# Patient Record
Sex: FEMALE | Race: WHITE | NOT HISPANIC OR LATINO | Employment: FULL TIME | URBAN - METROPOLITAN AREA
[De-identification: names, ages, dates, MRNs, and addresses within clinical notes are randomized per-mention and may not be internally consistent; named-entity substitution may affect disease eponyms.]

---

## 2017-01-17 ENCOUNTER — ALLSCRIPTS OFFICE VISIT (OUTPATIENT)
Dept: OTHER | Facility: OTHER | Age: 42
End: 2017-01-17

## 2017-01-17 DIAGNOSIS — E78.5 HYPERLIPIDEMIA: ICD-10-CM

## 2017-01-17 DIAGNOSIS — E55.9 VITAMIN D DEFICIENCY: ICD-10-CM

## 2017-01-17 DIAGNOSIS — D51.9 VITAMIN B12 DEFICIENCY ANEMIA: ICD-10-CM

## 2017-03-08 ENCOUNTER — TRANSCRIBE ORDERS (OUTPATIENT)
Dept: ADMINISTRATIVE | Facility: HOSPITAL | Age: 42
End: 2017-03-08

## 2017-03-08 ENCOUNTER — ALLSCRIPTS OFFICE VISIT (OUTPATIENT)
Dept: OTHER | Facility: OTHER | Age: 42
End: 2017-03-08

## 2017-03-08 DIAGNOSIS — R10.9 ABDOMINAL PAIN, UNSPECIFIED SITE: Primary | ICD-10-CM

## 2017-03-16 ENCOUNTER — HOSPITAL ENCOUNTER (OUTPATIENT)
Dept: RADIOLOGY | Age: 42
Discharge: HOME | End: 2017-03-16
Payer: COMMERCIAL

## 2017-03-16 DIAGNOSIS — R10.9 ABDOMINAL PAIN, UNSPECIFIED SITE: ICD-10-CM

## 2017-03-16 PROCEDURE — 74177 CT ABD & PELVIS W/CONTRAST: CPT

## 2017-03-16 RX ADMIN — IOHEXOL 100 ML: 350 INJECTION, SOLUTION INTRAVENOUS at 15:17

## 2017-04-11 ENCOUNTER — ALLSCRIPTS OFFICE VISIT (OUTPATIENT)
Dept: OTHER | Facility: OTHER | Age: 42
End: 2017-04-11

## 2017-04-11 DIAGNOSIS — E55.9 VITAMIN D DEFICIENCY: ICD-10-CM

## 2017-05-31 DIAGNOSIS — Z72.51 HIGH RISK HETEROSEXUAL BEHAVIOR: ICD-10-CM

## 2017-06-06 ENCOUNTER — ALLSCRIPTS OFFICE VISIT (OUTPATIENT)
Dept: OTHER | Facility: OTHER | Age: 42
End: 2017-06-06

## 2017-10-10 ENCOUNTER — GENERIC CONVERSION - ENCOUNTER (OUTPATIENT)
Dept: OTHER | Facility: OTHER | Age: 42
End: 2017-10-10

## 2017-10-10 DIAGNOSIS — E55.9 VITAMIN D DEFICIENCY: ICD-10-CM

## 2018-01-14 VITALS
WEIGHT: 158.5 LBS | OXYGEN SATURATION: 97 % | SYSTOLIC BLOOD PRESSURE: 108 MMHG | BODY MASS INDEX: 28.08 KG/M2 | HEIGHT: 63 IN | DIASTOLIC BLOOD PRESSURE: 78 MMHG | TEMPERATURE: 98.6 F | HEART RATE: 89 BPM

## 2018-01-14 VITALS
WEIGHT: 154.4 LBS | SYSTOLIC BLOOD PRESSURE: 110 MMHG | BODY MASS INDEX: 27.36 KG/M2 | OXYGEN SATURATION: 98 % | DIASTOLIC BLOOD PRESSURE: 66 MMHG | HEIGHT: 63 IN | TEMPERATURE: 97.3 F | HEART RATE: 89 BPM

## 2018-01-14 VITALS
HEIGHT: 63 IN | OXYGEN SATURATION: 98 % | HEART RATE: 78 BPM | WEIGHT: 161 LBS | DIASTOLIC BLOOD PRESSURE: 74 MMHG | TEMPERATURE: 97.8 F | BODY MASS INDEX: 28.53 KG/M2 | SYSTOLIC BLOOD PRESSURE: 106 MMHG

## 2018-01-15 VITALS
SYSTOLIC BLOOD PRESSURE: 114 MMHG | DIASTOLIC BLOOD PRESSURE: 78 MMHG | BODY MASS INDEX: 28.39 KG/M2 | TEMPERATURE: 98.6 F | OXYGEN SATURATION: 98 % | HEART RATE: 96 BPM | HEIGHT: 63 IN | WEIGHT: 160.25 LBS

## 2018-01-22 VITALS
HEART RATE: 86 BPM | TEMPERATURE: 99.1 F | SYSTOLIC BLOOD PRESSURE: 118 MMHG | WEIGHT: 155.5 LBS | OXYGEN SATURATION: 98 % | BODY MASS INDEX: 27.55 KG/M2 | HEIGHT: 63 IN | DIASTOLIC BLOOD PRESSURE: 74 MMHG

## 2018-03-14 ENCOUNTER — TELEPHONE (OUTPATIENT)
Dept: INTERNAL MEDICINE CLINIC | Facility: CLINIC | Age: 43
End: 2018-03-14

## 2018-03-14 DIAGNOSIS — D50.9 IRON DEFICIENCY ANEMIA, UNSPECIFIED IRON DEFICIENCY ANEMIA TYPE: Primary | ICD-10-CM

## 2018-03-14 NOTE — TELEPHONE ENCOUNTER
This patient called the office today, she has labs to get done but she wanted to see if Levie Ashley could be added to her labs  She is seeing Sergey on 3/19/18 at the [facility] office  If this can be done, please let me know so I can tell her before she comes to [facility] to get her labs drawn

## 2018-03-19 ENCOUNTER — OFFICE VISIT (OUTPATIENT)
Dept: INTERNAL MEDICINE CLINIC | Age: 43
End: 2018-03-19
Payer: COMMERCIAL

## 2018-03-19 VITALS
DIASTOLIC BLOOD PRESSURE: 70 MMHG | HEART RATE: 86 BPM | OXYGEN SATURATION: 99 % | SYSTOLIC BLOOD PRESSURE: 102 MMHG | TEMPERATURE: 97.8 F | BODY MASS INDEX: 27.39 KG/M2 | HEIGHT: 63 IN | WEIGHT: 154.6 LBS

## 2018-03-19 DIAGNOSIS — E55.9 VITAMIN D DEFICIENCY: ICD-10-CM

## 2018-03-19 DIAGNOSIS — E83.10 IRON STORAGE DISEASE: ICD-10-CM

## 2018-03-19 DIAGNOSIS — E78.5 BORDERLINE HYPERLIPIDEMIA: ICD-10-CM

## 2018-03-19 DIAGNOSIS — D50.9 IRON DEFICIENCY ANEMIA, UNSPECIFIED IRON DEFICIENCY ANEMIA TYPE: Primary | ICD-10-CM

## 2018-03-19 DIAGNOSIS — E53.8 VITAMIN B12 DEFICIENCY: ICD-10-CM

## 2018-03-19 PROBLEM — R79.89 LOW VITAMIN D LEVEL: Status: ACTIVE | Noted: 2017-01-17

## 2018-03-19 PROCEDURE — 99213 OFFICE O/P EST LOW 20 MIN: CPT | Performed by: NURSE PRACTITIONER

## 2018-03-19 RX ORDER — CLONAZEPAM 0.5 MG/1
1 TABLET ORAL 2 TIMES DAILY
COMMUNITY
End: 2018-10-15 | Stop reason: ALTCHOICE

## 2018-03-19 RX ORDER — VILAZODONE HYDROCHLORIDE 40 MG/1
1 TABLET ORAL DAILY
COMMUNITY
Start: 2017-03-08 | End: 2018-10-15 | Stop reason: ALTCHOICE

## 2018-03-19 RX ORDER — LITHIUM CARBONATE 300 MG/1
300 CAPSULE ORAL EVERY MORNING
Refills: 0 | COMMUNITY
Start: 2018-03-14 | End: 2019-08-05

## 2018-03-19 RX ORDER — ZOLPIDEM TARTRATE 10 MG/1
TABLET ORAL
COMMUNITY
End: 2018-10-15 | Stop reason: ALTCHOICE

## 2018-03-19 RX ORDER — LANOLIN ALCOHOL/MO/W.PET/CERES
1 CREAM (GRAM) TOPICAL DAILY
COMMUNITY
Start: 2017-10-10 | End: 2019-08-16 | Stop reason: HOSPADM

## 2018-03-19 RX ORDER — BUPROPION HYDROCHLORIDE 300 MG/1
300 TABLET ORAL DAILY
Refills: 0 | COMMUNITY
Start: 2018-03-14 | End: 2018-10-15 | Stop reason: ALTCHOICE

## 2018-03-19 RX ORDER — TOPIRAMATE 100 MG/1
100 TABLET, FILM COATED ORAL DAILY
Refills: 0 | COMMUNITY
Start: 2018-03-14 | End: 2018-10-15 | Stop reason: ALTCHOICE

## 2018-03-19 NOTE — PROGRESS NOTES
Assessment/Plan:    No problem-specific Assessment & Plan notes found for this encounter  Diagnoses and all orders for this visit:    Iron deficiency anemia, unspecified iron deficiency anemia type  -     CBC and differential; Future  -     Comprehensive metabolic panel; Future  -     TSH, 3rd generation with T4 reflex; Future    Vitamin B12 deficiency  -     Vitamin B12; Future    Vitamin D deficiency  -     Vitamin D 25 hydroxy; Future    Iron storage disease    Borderline hyperlipidemia  -     Comprehensive metabolic panel; Future  -     TSH, 3rd generation with T4 reflex; Future  -     Lipid Panel with Direct LDL reflex; Future    Other orders  -     buPROPion (WELLBUTRIN XL) 300 mg 24 hr tablet; Take 300 mg by mouth daily  -     clonazePAM (KlonoPIN) 0 5 mg tablet; Take 1 mg by mouth 2 (two) times a day  -     lithium 600 MG capsule; Take 600 mg by mouth daily  -     topiramate (TOPAMAX) 100 mg tablet; Take 100 mg by mouth daily  -     Acetaminophen 500 MG; Take by mouth 4 times daily  -     cyanocobalamin (VITAMIN B-12) 1,000 mcg tablet; Take 1 tablet by mouth daily  -     vilazodone (VIIBRYD) 40 mg tablet; Take 1 tablet by mouth daily  -     zolpidem (AMBIEN) 10 mg tablet; Take by mouth  -     cholecalciferol (VITAMIN D3) 51994 units capsule; Take 10,000 Units by mouth once a week      Will order labs to have done in the next few days  Follow up in 1-2 weeks to discuss results and formulate a treatment plan based on these results  Subjective:      Patient ID: Antoni Sequeira is a 39 y o  female  Patient presents for a follow-up visit to discuss her vitamin D deficiency and anemia  She did not have labs done before this appointment  She reports that she has been feeling very tired lately  She reports that she is stressed lately and does not know if this could be a potential cause of her fatigue  She does report that she stopped eating meat last summer           The following portions of the patient's history were reviewed and updated as appropriate: allergies, current medications, past family history, past medical history, past social history, past surgical history and problem list     Review of Systems   Constitutional: Positive for fatigue  Negative for chills and fever  HENT: Positive for hearing loss (genetic hearing loss)  Negative for trouble swallowing  Eyes: Negative for pain  Respiratory: Negative for chest tightness, shortness of breath and wheezing  Cardiovascular: Negative for chest pain, palpitations and leg swelling  Gastrointestinal: Negative for abdominal pain, blood in stool, diarrhea (rarely, but not a new symptom), nausea and vomiting  Endocrine: Negative for cold intolerance, heat intolerance, polydipsia, polyphagia and polyuria  Genitourinary: Negative for dysuria  Musculoskeletal: Negative for gait problem  Skin: Negative for rash  Neurological: Negative for dizziness, light-headedness and headaches  Hematological: Bruises/bleeds easily (increased bruising)  Psychiatric/Behavioral: Positive for decreased concentration  The patient is nervous/anxious            Past Medical History:   Diagnosis Date    Anemia due to vitamin B12 deficiency     last assessed 10/10/17, iron deficiency anemia    Bipolar affective disorder (HCC)     current episode mixed, current episode severity unspecified, last assessed 3/8/17         Current Outpatient Prescriptions:     Acetaminophen 500 MG, Take by mouth 4 times daily, Disp: , Rfl:     buPROPion (WELLBUTRIN XL) 300 mg 24 hr tablet, Take 300 mg by mouth daily, Disp: , Rfl: 0    cholecalciferol (VITAMIN D3) 29622 units capsule, Take 10,000 Units by mouth once a week, Disp: , Rfl:     clonazePAM (KlonoPIN) 0 5 mg tablet, Take 1 mg by mouth 2 (two) times a day, Disp: , Rfl:     cyanocobalamin (VITAMIN B-12) 1,000 mcg tablet, Take 1 tablet by mouth daily, Disp: , Rfl:     lithium 600 MG capsule, Take 600 mg by mouth daily, Disp: , Rfl: 0    topiramate (TOPAMAX) 100 mg tablet, Take 100 mg by mouth daily, Disp: , Rfl: 0    vilazodone (VIIBRYD) 40 mg tablet, Take 1 tablet by mouth daily, Disp: , Rfl:     zolpidem (AMBIEN) 10 mg tablet, Take by mouth, Disp: , Rfl:     Allergies   Allergen Reactions    Iron Sucrose Anaphylaxis    Lamotrigine Rash and Other (See Comments)     David Spearfish syndrome    Sulfa Antibiotics Rash       Social History   Past Surgical History:   Procedure Laterality Date    TOTAL ABDOMINAL HYSTERECTOMY      TUBAL LIGATION       Family History   Problem Relation Age of Onset    Hyperlipidemia Mother     Hyperlipidemia Father     Asthma Son     Asthma Daughter     Stroke Family      CVA    Hypertension Family     Cancer Family        Objective:  /70 (BP Location: Left arm, Patient Position: Sitting, Cuff Size: Standard)   Pulse 86   Temp 97 8 °F (36 6 °C) (Oral)   Ht 5' 2 76" (1 594 m)   Wt 70 1 kg (154 lb 9 6 oz)   SpO2 99%   BMI 27 60 kg/m²        Physical Exam   Constitutional: She is oriented to person, place, and time  She appears well-developed and well-nourished  No distress  HENT:   Head: Normocephalic and atraumatic  Right Ear: External ear normal    Left Ear: External ear normal    Mouth/Throat: Oropharynx is clear and moist    Eyes: Conjunctivae are normal  Pupils are equal, round, and reactive to light  No scleral icterus  Neck: Normal range of motion  Neck supple  No thyromegaly present  Cardiovascular: Normal rate, regular rhythm and normal heart sounds  Pulmonary/Chest: Effort normal and breath sounds normal  No respiratory distress  Abdominal: Soft  Bowel sounds are normal  She exhibits no distension  Musculoskeletal: Normal range of motion  She exhibits no edema  Neurological: She is alert and oriented to person, place, and time  Skin: Skin is warm and dry  Psychiatric: She has a normal mood and affect   Her behavior is normal  Judgment and thought content normal    Vitals reviewed

## 2018-03-19 NOTE — PATIENT INSTRUCTIONS
Will order labs to have done in the next few days  Follow up in 1-2 weeks to discuss results and formulate a treatment plan based on these results

## 2018-04-03 ENCOUNTER — OFFICE VISIT (OUTPATIENT)
Dept: INTERNAL MEDICINE CLINIC | Age: 43
End: 2018-04-03
Payer: COMMERCIAL

## 2018-04-03 VITALS
WEIGHT: 156.2 LBS | SYSTOLIC BLOOD PRESSURE: 99 MMHG | HEART RATE: 80 BPM | BODY MASS INDEX: 27.68 KG/M2 | TEMPERATURE: 98.1 F | HEIGHT: 63 IN | DIASTOLIC BLOOD PRESSURE: 66 MMHG | OXYGEN SATURATION: 99 %

## 2018-04-03 DIAGNOSIS — R79.89 LOW VITAMIN D LEVEL: ICD-10-CM

## 2018-04-03 DIAGNOSIS — E78.5 BORDERLINE HYPERLIPIDEMIA: ICD-10-CM

## 2018-04-03 DIAGNOSIS — D50.9 IRON DEFICIENCY ANEMIA, UNSPECIFIED IRON DEFICIENCY ANEMIA TYPE: Primary | ICD-10-CM

## 2018-04-03 DIAGNOSIS — E53.8 VITAMIN B12 DEFICIENCY: ICD-10-CM

## 2018-04-03 PROCEDURE — 3008F BODY MASS INDEX DOCD: CPT | Performed by: NURSE PRACTITIONER

## 2018-04-03 PROCEDURE — 99214 OFFICE O/P EST MOD 30 MIN: CPT | Performed by: NURSE PRACTITIONER

## 2018-04-03 NOTE — PROGRESS NOTES
Assessment/Plan:    No problem-specific Assessment & Plan notes found for this encounter  Diagnoses and all orders for this visit:    Iron deficiency anemia, unspecified iron deficiency anemia type    Borderline hyperlipidemia    Low vitamin D level    Vitamin B12 deficiency      Labs reviewed and all results are very good  Patient agrees and is pleased  Continue to follow up with psychiatry as advised and return for follow-up in 6 months or sooner as needed  Subjective:      Patient ID: Cecil Mora is a 39 y o  female  Patient presents for a follow-up on her anemia, vitamin D deficiency, vitamin B12 deficiency, and borderline hyperlipidemia  She was seen on 3/19/18 for the same conditions, but she had not had any labs checked before that visit, so it was difficult to assess those conditions  She did have her labs done last week and the results are available for review  She was concerned about her lab results due to her continued fatigue  She admits that she has a lot of stressors at home, but wanted to be checked for physiological causes for her fatigue first  She does find that her fatigue waxes and wanes and is willing to accept that it might be related to some of her psychiatric medications  She had access on her portal to check her lithium level that is managed by her psychiatrist and she notes that it is in a therapeutic range, as well  The following portions of the patient's history were reviewed and updated as appropriate: allergies, current medications, past family history, past medical history, past social history, past surgical history and problem list     Review of Systems   Constitutional: Negative for chills, fatigue (Patient does not feel fatigued right now) and fever  HENT: Negative for postnasal drip and trouble swallowing  Eyes: Negative for pain  Respiratory: Negative for chest tightness, shortness of breath and wheezing      Cardiovascular: Negative for chest pain and leg swelling  Gastrointestinal: Negative for abdominal pain, diarrhea, nausea and vomiting  Genitourinary: Negative for dysuria  Musculoskeletal: Negative for gait problem  Skin: Negative for rash  Neurological: Negative for headaches (rarely)  Psychiatric/Behavioral: Positive for sleep disturbance (Has some difficulty sleeping, but is managed by psychiatry every 2 months at present)  The patient is nervous/anxious (on occasion,  but is managed by psychiatry)            Past Medical History:   Diagnosis Date    Anemia due to vitamin B12 deficiency     last assessed 10/10/17, iron deficiency anemia    Bipolar affective disorder (HCC)     current episode mixed, current episode severity unspecified, last assessed 3/8/17         Current Outpatient Prescriptions:     Acetaminophen 500 MG, Take by mouth 4 times daily, Disp: , Rfl:     buPROPion (WELLBUTRIN XL) 300 mg 24 hr tablet, Take 300 mg by mouth daily, Disp: , Rfl: 0    cholecalciferol (VITAMIN D3) 63308 units capsule, Take 10,000 Units by mouth once a week, Disp: , Rfl:     clonazePAM (KlonoPIN) 0 5 mg tablet, Take 1 mg by mouth 2 (two) times a day, Disp: , Rfl:     lithium 600 MG capsule, Take 600 mg by mouth daily, Disp: , Rfl: 0    topiramate (TOPAMAX) 100 mg tablet, Take 100 mg by mouth daily, Disp: , Rfl: 0    vilazodone (VIIBRYD) 40 mg tablet, Take 1 tablet by mouth daily, Disp: , Rfl:     zolpidem (AMBIEN) 10 mg tablet, Take by mouth, Disp: , Rfl:     cyanocobalamin (VITAMIN B-12) 1,000 mcg tablet, Take 1 tablet by mouth daily, Disp: , Rfl:     Allergies   Allergen Reactions    Iron Sucrose Anaphylaxis    Lamotrigine Rash and Other (See Comments)     Paullette Ovens syndrome    Sulfa Antibiotics Rash       Social History   Past Surgical History:   Procedure Laterality Date    TOTAL ABDOMINAL HYSTERECTOMY      TUBAL LIGATION       Family History   Problem Relation Age of Onset    Hyperlipidemia Mother     Hyperlipidemia Father  Asthma Son     Asthma Daughter     Stroke Family      CVA    Hypertension Family     Cancer Family        Objective:  BP 99/66 (BP Location: Left arm, Patient Position: Sitting, Cuff Size: Standard)   Pulse 80   Temp 98 1 °F (36 7 °C) (Tympanic)   Ht 5' 2 87" (1 597 m)   Wt 70 9 kg (156 lb 3 2 oz)   SpO2 99%   BMI 27 78 kg/m²     Labs 3/28/18  Chol 164; trig 108; HDL 72; LDL 92; Vit B12 352; Vit D 29L; H/H 12 7/38 5; TSH 2 36; Ferritin 187  Other CBC/CMP WNL  Physical Exam   Constitutional: She is oriented to person, place, and time  She appears well-developed and well-nourished  No distress  HENT:   Head: Normocephalic and atraumatic  Right Ear: External ear normal    Left Ear: External ear normal    Mouth/Throat: Oropharynx is clear and moist    Eyes: Conjunctivae are normal  Pupils are equal, round, and reactive to light  No scleral icterus  Neck: Normal range of motion  Neck supple  No thyromegaly present  Cardiovascular: Normal rate, regular rhythm and normal heart sounds  Pulmonary/Chest: Effort normal and breath sounds normal  No respiratory distress  Abdominal: Soft  Bowel sounds are normal  She exhibits no distension  Musculoskeletal: Normal range of motion  She exhibits no edema  Neurological: She is alert and oriented to person, place, and time  Skin: Skin is warm and dry  Psychiatric: She has a normal mood and affect  Her behavior is normal  Judgment and thought content normal    Vitals reviewed

## 2018-04-03 NOTE — PATIENT INSTRUCTIONS
Labs reviewed and all results are very good  Patient agrees and is pleased  Continue to follow up with psychiatry as advised and return for follow-up in 6 months or sooner as needed

## 2018-10-15 ENCOUNTER — OFFICE VISIT (OUTPATIENT)
Dept: INTERNAL MEDICINE CLINIC | Age: 43
End: 2018-10-15
Payer: COMMERCIAL

## 2018-10-15 VITALS
OXYGEN SATURATION: 98 % | BODY MASS INDEX: 25.94 KG/M2 | HEART RATE: 74 BPM | DIASTOLIC BLOOD PRESSURE: 62 MMHG | TEMPERATURE: 98.3 F | SYSTOLIC BLOOD PRESSURE: 94 MMHG | WEIGHT: 146.4 LBS | HEIGHT: 63 IN

## 2018-10-15 DIAGNOSIS — E83.10 IRON STORAGE DISEASE: ICD-10-CM

## 2018-10-15 DIAGNOSIS — F10.20 ALCOHOL USE DISORDER, MODERATE, IN CONTROLLED ENVIRONMENT (HCC): ICD-10-CM

## 2018-10-15 DIAGNOSIS — R79.89 LOW VITAMIN D LEVEL: ICD-10-CM

## 2018-10-15 DIAGNOSIS — D50.9 IRON DEFICIENCY ANEMIA, UNSPECIFIED IRON DEFICIENCY ANEMIA TYPE: Primary | ICD-10-CM

## 2018-10-15 PROBLEM — Z72.89 ALCOHOL USE: Status: ACTIVE | Noted: 2018-09-12

## 2018-10-15 PROBLEM — IMO0002 ALCOHOL USE DISORDER: Status: ACTIVE | Noted: 2018-09-12

## 2018-10-15 PROBLEM — E83.51 HYPOCALCEMIA: Status: ACTIVE | Noted: 2018-09-09

## 2018-10-15 PROCEDURE — 99214 OFFICE O/P EST MOD 30 MIN: CPT | Performed by: NURSE PRACTITIONER

## 2018-10-15 RX ORDER — LORATADINE 10 MG/1
10 TABLET ORAL DAILY PRN
COMMUNITY
End: 2019-08-16 | Stop reason: HOSPADM

## 2018-10-15 RX ORDER — LITHIUM CARBONATE 450 MG
450 TABLET, EXTENDED RELEASE ORAL EVERY EVENING
COMMUNITY
Start: 2018-10-04 | End: 2018-10-24 | Stop reason: ALTCHOICE

## 2018-10-15 RX ORDER — ARIPIPRAZOLE 2 MG/1
2 TABLET ORAL
COMMUNITY
Start: 2018-09-18 | End: 2019-06-10 | Stop reason: CLARIF

## 2018-10-15 NOTE — PROGRESS NOTES
Assessment/Plan:    No problem-specific Assessment & Plan notes found for this encounter  Diagnoses and all orders for this visit:    Iron deficiency anemia, unspecified iron deficiency anemia type  -     CBC and differential; Future  -     Iron Panel; Future    Low vitamin D level  -     Vitamin D 25 hydroxy; Future    Iron storage disease    [facility]  -     Comprehensive metabolic panel; Future    Other orders  -     lithium carbonate (LITHOBID) 450 mg CR tablet; Take 450 mg by mouth every evening    -     ARIPiprazole (ABILIFY) 2 mg tablet; Take 2 mg by mouth  -     loratadine (CLARITIN) 10 mg tablet; Take 10 mg by mouth daily      Overall, the patient seems to be doing well since being stabilized on her new medication  Will check labs and have patient return in approximately 1 month to review the results  Red flag signs reviewed with the patient to go to the emergency department  She is closely followed by her psychiatrist and psychologist and feels that she is in a much better emotional state than she had been for some time prior to her admission in September  Subjective:      Patient ID: Esequiel Holter is a 39 y o  female  Patient presents for a follow-up on her anemia  She has not had labs done that were ordered from this office since March 2018  She does report that she was admitted to [facility] after an intentional overdose on her medication  She reports that she does not remember doing it but does report that she had been feeling that her medication was not working well for her overall and that she was very out of control of her emotions    After her admission and subsequent transfer to [facility] in patient, she feels that she has been stabilized on her current medication and has been following closely with her psychiatrist and therapist   She feels that her current medication is working very well for her and she feels well overall  She reports that she has been back to work and is doing much better than she was before her overdose  She feels that her mood has been stablized and she does not have any thoughts to self-harm  She feels safe and confident in moving forward with work  The following portions of the patient's history were reviewed and updated as appropriate: allergies, current medications, past family history, past medical history, past social history, past surgical history and problem list     Review of Systems   Constitutional: Positive for fatigue (from going back to work, works night shift)  Negative for chills and fever  HENT: Positive for tinnitus (at times, not new for the patient)  Negative for dental problem (has an appointment with the dentist next week) and trouble swallowing  Eyes: Positive for visual disturbance (since her overdose, she feels that she sees better with her glasses off  She will make an appointment to see her eye doctor)  Respiratory: Negative for shortness of breath (on quick exertion, feels that it is due to deconditioning )  Cardiovascular: Negative for chest pain  Gastrointestinal: Positive for diarrhea (at times, she feels that it may be from the medication)  Negative for abdominal pain, nausea and vomiting  Genitourinary: Negative for dysuria  Musculoskeletal: Negative for gait problem  Skin: Negative for rash  Neurological: Positive for dizziness (positional, rarely)  Negative for seizures, syncope and headaches  Psychiatric/Behavioral: Positive for confusion (at times, from the medication, per the patient) and decreased concentration (at times)  Negative for hallucinations and suicidal ideas  The patient is not nervous/anxious            Past Medical History:   Diagnosis Date    Anemia due to vitamin B12 deficiency     last assessed 10/10/17, iron deficiency anemia    [facility]     current episode mixed, current episode severity unspecified, last assessed 3/8/17         Current Outpatient Prescriptions:     Acetaminophen 500 MG, Take by mouth 4 times daily, Disp: , Rfl:     ARIPiprazole (ABILIFY) 2 mg tablet, Take 2 mg by mouth, Disp: , Rfl:     cholecalciferol (VITAMIN D3) 01242 units capsule, Take 10,000 Units by mouth once a week, Disp: , Rfl:     cyanocobalamin (VITAMIN B-12) 1,000 mcg tablet, Take 1 tablet by mouth daily, Disp: , Rfl:     lithium carbonate (LITHOBID) 450 mg CR tablet, Take 450 mg by mouth every evening  , Disp: , Rfl:     lithium carbonate 300 mg capsule, Take 300 mg by mouth every morning  , Disp: , Rfl: 0    loratadine (CLARITIN) 10 mg tablet, Take 10 mg by mouth daily, Disp: , Rfl:     Allergies   Allergen Reactions    Lamotrigine Rash and Other (See Comments)     King Pool syndrome    Venofer [Iron Sucrose] Anaphylaxis     IV Venofer    Sulfa Antibiotics Rash       Social History   Past Surgical History:   Procedure Laterality Date    TOTAL ABDOMINAL HYSTERECTOMY      TUBAL LIGATION       Family History   Problem Relation Age of Onset    Hyperlipidemia Mother     Hyperlipidemia Father     Asthma Son     Asthma Daughter     Stroke Family         CVA    Hypertension Family     Cancer Family        Objective:  BP 94/62 (BP Location: Left arm, Patient Position: Sitting, Cuff Size: Standard)   Pulse 74   Temp 98 3 °F (36 8 °C) (Tympanic)   Ht 5' 2 84" (1 596 m)   Wt 66 4 kg (146 lb 6 4 oz)   SpO2 98%   BMI 26 07 kg/m²        Physical Exam   Constitutional: She is oriented to person, place, and time  She appears well-developed and well-nourished  No distress  HENT:   Head: Normocephalic and atraumatic  Right Ear: External ear normal    Left Ear: External ear normal    Mouth/Throat: Oropharynx is clear and moist    Eyes: Pupils are equal, round, and reactive to light  Conjunctivae are normal  No scleral icterus  Neck: Normal range of motion  Neck supple  No thyromegaly present  Cardiovascular: Normal rate, regular rhythm and normal heart sounds  Pulmonary/Chest: Effort normal and breath sounds normal  No respiratory distress  Abdominal: Soft  Bowel sounds are normal  She exhibits no distension  Musculoskeletal: Normal range of motion  She exhibits no edema  Neurological: She is alert and oriented to person, place, and time  Skin: Skin is warm and dry  Psychiatric: She has a normal mood and affect  Her behavior is normal  Judgment and thought content normal    Vitals reviewed

## 2018-10-22 NOTE — PROGRESS NOTES
Assessment/Plan:     Calcium 1000 mg + 600 IU Vit D daily  No further paps needed  Annual mammogram, monthly BSE  Exercise 150 minutes per week minimum  Kegels 20 times twice daily  Negative depression screen    There are no diagnoses linked to this encounter  Subjective:      Patient ID: Yanni Hopson is a 39 y o  female  Yanni Hopson is a 39 y o  female who is here today as a new patient for her annual visit  Last gyn exam was > 4 years ago after [facility]  No health concerns  Denies any vaginal bleeding or abnormal vaginal discharge  Yanni Hopson is not sexually active  Last coitus approx 6/18  No condom use with that male partner  Desires GC/CT testing  Believes she already had HIV testing  Does not exercise  Works FT as RN at [employer]  The following portions of the patient's history were reviewed and updated as appropriate: allergies, current medications, past family history, past medical history, past social history, past surgical history and problem list     Review of Systems   Constitutional: Negative  Negative for activity change, appetite change, chills, diaphoresis, fatigue, fever and unexpected weight change  HENT: Negative for congestion, dental problem, sneezing, sore throat and trouble swallowing  Eyes: Negative for visual disturbance  Respiratory: Negative for chest tightness and shortness of breath  Cardiovascular: Negative for chest pain and leg swelling  Gastrointestinal: Negative for abdominal pain, constipation, diarrhea, nausea and vomiting  Genitourinary: Negative for difficulty urinating, dyspareunia, dysuria, frequency, hematuria, pelvic pain, urgency, vaginal bleeding, vaginal discharge and vaginal pain  Musculoskeletal: Negative for back pain and neck pain  Skin: Negative  Allergic/Immunologic: Negative  Neurological: Negative for weakness and headaches  Hematological: Negative for adenopathy  Psychiatric/Behavioral: Negative  Objective: There were no vitals taken for this visit  Physical Exam   Constitutional: She is oriented to person, place, and time  She appears well-developed and well-nourished  HENT:   Head: Normocephalic and atraumatic  Eyes: Right eye exhibits no discharge  Left eye exhibits no discharge  Neck: Normal range of motion  Neck supple  Cardiovascular: Normal rate, regular rhythm, normal heart sounds and intact distal pulses  Pulmonary/Chest: Effort normal and breath sounds normal    Abdominal: Soft  Genitourinary: Vagina normal  Rectal exam shows no external hemorrhoid  No breast swelling, tenderness, discharge or bleeding  No labial fusion  There is no rash, tenderness, lesion or injury on the right labia  There is no rash, tenderness, lesion or injury on the left labia  Right adnexum displays no mass, no tenderness and no fullness  Left adnexum displays no mass, no tenderness and no fullness  No erythema, tenderness or bleeding in the vagina  No foreign body in the vagina  No signs of injury around the vagina  No vaginal discharge found  Genitourinary Comments: Uterus and cervix are surgically absent  Clitoral gotti is pierced  Slight difficulty performing a kegel squeeze   Musculoskeletal: Normal range of motion  Lymphadenopathy:     She has no cervical adenopathy  Right: No inguinal adenopathy present  Left: No inguinal adenopathy present  Neurological: She is alert and oriented to person, place, and time  Skin: Skin is warm and dry  Psychiatric: She has a normal mood and affect  Nursing note and vitals reviewed

## 2018-10-23 ENCOUNTER — APPOINTMENT (OUTPATIENT)
Dept: LAB | Facility: OTHER | Age: 43
End: 2018-10-23
Payer: COMMERCIAL

## 2018-10-23 DIAGNOSIS — R79.89 LOW VITAMIN D LEVEL: ICD-10-CM

## 2018-10-23 DIAGNOSIS — D50.9 IRON DEFICIENCY ANEMIA, UNSPECIFIED IRON DEFICIENCY ANEMIA TYPE: ICD-10-CM

## 2018-10-23 DIAGNOSIS — F10.20 ALCOHOL USE DISORDER, MODERATE, IN CONTROLLED ENVIRONMENT (HCC): ICD-10-CM

## 2018-10-23 LAB
25(OH)D3 SERPL-MCNC: 25.9 NG/ML (ref 30–100)
ALBUMIN SERPL BCP-MCNC: 3.7 G/DL (ref 3.5–5)
ALP SERPL-CCNC: 41 U/L (ref 46–116)
ALT SERPL W P-5'-P-CCNC: 21 U/L (ref 12–78)
ANION GAP SERPL CALCULATED.3IONS-SCNC: 6 MMOL/L (ref 4–13)
AST SERPL W P-5'-P-CCNC: 9 U/L (ref 5–45)
BASOPHILS # BLD AUTO: 0.05 THOUSANDS/ΜL (ref 0–0.1)
BASOPHILS NFR BLD AUTO: 1 % (ref 0–1)
BILIRUB SERPL-MCNC: 0.34 MG/DL (ref 0.2–1)
BUN SERPL-MCNC: 8 MG/DL (ref 5–25)
CALCIUM SERPL-MCNC: 8.9 MG/DL (ref 8.3–10.1)
CHLORIDE SERPL-SCNC: 107 MMOL/L (ref 100–108)
CO2 SERPL-SCNC: 26 MMOL/L (ref 21–32)
CREAT SERPL-MCNC: 0.75 MG/DL (ref 0.6–1.3)
EOSINOPHIL # BLD AUTO: 0.27 THOUSAND/ΜL (ref 0–0.61)
EOSINOPHIL NFR BLD AUTO: 3 % (ref 0–6)
ERYTHROCYTE [DISTWIDTH] IN BLOOD BY AUTOMATED COUNT: 12.2 % (ref 11.6–15.1)
FERRITIN SERPL-MCNC: 149 NG/ML (ref 8–388)
GFR SERPL CREATININE-BSD FRML MDRD: 97 ML/MIN/1.73SQ M
GLUCOSE P FAST SERPL-MCNC: 97 MG/DL (ref 65–99)
HCT VFR BLD AUTO: 39.5 % (ref 34.8–46.1)
HGB BLD-MCNC: 12.5 G/DL (ref 11.5–15.4)
IMM GRANULOCYTES # BLD AUTO: 0.01 THOUSAND/UL (ref 0–0.2)
IMM GRANULOCYTES NFR BLD AUTO: 0 % (ref 0–2)
IRON SATN MFR SERPL: 32 %
IRON SERPL-MCNC: 94 UG/DL (ref 50–170)
LYMPHOCYTES # BLD AUTO: 2.56 THOUSANDS/ΜL (ref 0.6–4.47)
LYMPHOCYTES NFR BLD AUTO: 28 % (ref 14–44)
MCH RBC QN AUTO: 30.8 PG (ref 26.8–34.3)
MCHC RBC AUTO-ENTMCNC: 31.6 G/DL (ref 31.4–37.4)
MCV RBC AUTO: 97 FL (ref 82–98)
MONOCYTES # BLD AUTO: 0.56 THOUSAND/ΜL (ref 0.17–1.22)
MONOCYTES NFR BLD AUTO: 6 % (ref 4–12)
NEUTROPHILS # BLD AUTO: 5.55 THOUSANDS/ΜL (ref 1.85–7.62)
NEUTS SEG NFR BLD AUTO: 62 % (ref 43–75)
NRBC BLD AUTO-RTO: 0 /100 WBCS
PLATELET # BLD AUTO: 274 THOUSANDS/UL (ref 149–390)
PMV BLD AUTO: 10.4 FL (ref 8.9–12.7)
POTASSIUM SERPL-SCNC: 4.2 MMOL/L (ref 3.5–5.3)
PROT SERPL-MCNC: 6.8 G/DL (ref 6.4–8.2)
RBC # BLD AUTO: 4.06 MILLION/UL (ref 3.81–5.12)
SODIUM SERPL-SCNC: 139 MMOL/L (ref 136–145)
TIBC SERPL-MCNC: 294 UG/DL (ref 250–450)
WBC # BLD AUTO: 9 THOUSAND/UL (ref 4.31–10.16)

## 2018-10-23 PROCEDURE — 85025 COMPLETE CBC W/AUTO DIFF WBC: CPT

## 2018-10-23 PROCEDURE — 83550 IRON BINDING TEST: CPT

## 2018-10-23 PROCEDURE — 83540 ASSAY OF IRON: CPT

## 2018-10-23 PROCEDURE — 80053 COMPREHEN METABOLIC PANEL: CPT

## 2018-10-23 PROCEDURE — 82728 ASSAY OF FERRITIN: CPT

## 2018-10-23 PROCEDURE — 82306 VITAMIN D 25 HYDROXY: CPT

## 2018-10-23 PROCEDURE — 36415 COLL VENOUS BLD VENIPUNCTURE: CPT

## 2018-10-24 ENCOUNTER — OFFICE VISIT (OUTPATIENT)
Dept: GYNECOLOGY | Facility: CLINIC | Age: 43
End: 2018-10-24
Payer: COMMERCIAL

## 2018-10-24 VITALS
WEIGHT: 148 LBS | HEART RATE: 65 BPM | BODY MASS INDEX: 26.22 KG/M2 | DIASTOLIC BLOOD PRESSURE: 60 MMHG | SYSTOLIC BLOOD PRESSURE: 102 MMHG | HEIGHT: 63 IN

## 2018-10-24 DIAGNOSIS — Z11.3 ROUTINE SCREENING FOR STI (SEXUALLY TRANSMITTED INFECTION): ICD-10-CM

## 2018-10-24 DIAGNOSIS — Z01.419 ENCNTR FOR GYN EXAM (GENERAL) (ROUTINE) W/O ABN FINDINGS: Primary | ICD-10-CM

## 2018-10-24 DIAGNOSIS — Z12.31 ENCOUNTER FOR SCREENING MAMMOGRAM FOR BREAST CANCER: ICD-10-CM

## 2018-10-24 PROCEDURE — 87491 CHLMYD TRACH DNA AMP PROBE: CPT | Performed by: NURSE PRACTITIONER

## 2018-10-24 PROCEDURE — 87591 N.GONORRHOEAE DNA AMP PROB: CPT | Performed by: NURSE PRACTITIONER

## 2018-10-24 PROCEDURE — S0610 ANNUAL GYNECOLOGICAL EXAMINA: HCPCS | Performed by: NURSE PRACTITIONER

## 2018-10-24 NOTE — PATIENT INSTRUCTIONS
Calcium 1000 mg + 600 IU Vit D daily  No further paps needed  Annual mammogram, monthly BSE  Exercise 150 minutes per week minimum  Kegels 20 times twice daily    Negative depression screen

## 2018-10-25 LAB
CHLAMYDIA DNA CVX QL NAA+PROBE: NORMAL
N GONORRHOEA DNA GENITAL QL NAA+PROBE: NORMAL

## 2018-11-09 ENCOUNTER — TELEPHONE (OUTPATIENT)
Dept: INTERNAL MEDICINE CLINIC | Age: 43
End: 2018-11-09

## 2018-11-09 NOTE — TELEPHONE ENCOUNTER
Patient cancelled her 1-month follow-up appointment with you for 11/12/18, no reason was given and she declined to reschedule

## 2018-11-13 NOTE — TELEPHONE ENCOUNTER
Called patient to discuss  She reports that she saw her labs online and did not feel that she needed this appointment due to her labs looking good  She is following with her psychiatrist/counselor regularly and feels well overall  She agrees to follow up in 3 months, transferred to the front office to schedule for then

## 2019-02-08 ENCOUNTER — HOSPITAL ENCOUNTER (OUTPATIENT)
Dept: RADIOLOGY | Age: 44
Discharge: HOME | End: 2019-02-08
Payer: COMMERCIAL

## 2019-02-08 VITALS — HEIGHT: 63 IN | BODY MASS INDEX: 26.58 KG/M2 | WEIGHT: 150 LBS

## 2019-02-08 DIAGNOSIS — Z12.31 ENCOUNTER FOR SCREENING MAMMOGRAM FOR BREAST CANCER: ICD-10-CM

## 2019-02-08 PROCEDURE — 77067 SCR MAMMO BI INCL CAD: CPT

## 2019-02-08 PROCEDURE — 77063 BREAST TOMOSYNTHESIS BI: CPT

## 2019-02-25 ENCOUNTER — TRANSCRIBE ORDERS (OUTPATIENT)
Dept: LAB | Facility: OTHER | Age: 44
End: 2019-02-25

## 2019-02-25 ENCOUNTER — APPOINTMENT (OUTPATIENT)
Dept: LAB | Facility: OTHER | Age: 44
End: 2019-02-25
Payer: COMMERCIAL

## 2019-02-25 DIAGNOSIS — F31.32 MODERATE DEPRESSED BIPOLAR I DISORDER (HCC): ICD-10-CM

## 2019-02-25 DIAGNOSIS — D50.9 IRON DEFICIENCY ANEMIA, UNSPECIFIED IRON DEFICIENCY ANEMIA TYPE: ICD-10-CM

## 2019-02-25 DIAGNOSIS — F31.32 MODERATE DEPRESSED BIPOLAR I DISORDER (HCC): Primary | ICD-10-CM

## 2019-02-25 DIAGNOSIS — E55.9 VITAMIN D DEFICIENCY: ICD-10-CM

## 2019-02-25 DIAGNOSIS — E53.8 VITAMIN B12 DEFICIENCY: ICD-10-CM

## 2019-02-25 DIAGNOSIS — E78.5 BORDERLINE HYPERLIPIDEMIA: ICD-10-CM

## 2019-02-25 LAB
25(OH)D3 SERPL-MCNC: 36.7 NG/ML (ref 30–100)
ALBUMIN SERPL BCP-MCNC: 4 G/DL (ref 3.5–5)
ALP SERPL-CCNC: 39 U/L (ref 46–116)
ALT SERPL W P-5'-P-CCNC: 16 U/L (ref 12–78)
ANION GAP SERPL CALCULATED.3IONS-SCNC: 6 MMOL/L (ref 4–13)
AST SERPL W P-5'-P-CCNC: 8 U/L (ref 5–45)
BILIRUB SERPL-MCNC: 0.32 MG/DL (ref 0.2–1)
BUN SERPL-MCNC: 10 MG/DL (ref 5–25)
CALCIUM SERPL-MCNC: 8.7 MG/DL (ref 8.3–10.1)
CHLORIDE SERPL-SCNC: 107 MMOL/L (ref 100–108)
CO2 SERPL-SCNC: 28 MMOL/L (ref 21–32)
CREAT SERPL-MCNC: 0.82 MG/DL (ref 0.6–1.3)
GFR SERPL CREATININE-BSD FRML MDRD: 87 ML/MIN/1.73SQ M
GLUCOSE P FAST SERPL-MCNC: 75 MG/DL (ref 65–99)
LITHIUM SERPL-SCNC: 0.8 MMOL/L (ref 0.5–1)
POTASSIUM SERPL-SCNC: 4.3 MMOL/L (ref 3.5–5.3)
PROT SERPL-MCNC: 6.9 G/DL (ref 6.4–8.2)
SODIUM SERPL-SCNC: 141 MMOL/L (ref 136–145)
TSH SERPL DL<=0.05 MIU/L-ACNC: 3.88 UIU/ML (ref 0.36–3.74)
VALPROATE SERPL-MCNC: 41 UG/ML (ref 50–100)
WBC # BLD AUTO: 8.64 THOUSAND/UL (ref 4.31–10.16)

## 2019-02-25 PROCEDURE — 36415 COLL VENOUS BLD VENIPUNCTURE: CPT

## 2019-02-25 PROCEDURE — 80164 ASSAY DIPROPYLACETIC ACD TOT: CPT

## 2019-02-25 PROCEDURE — 84443 ASSAY THYROID STIM HORMONE: CPT

## 2019-02-25 PROCEDURE — 80178 ASSAY OF LITHIUM: CPT

## 2019-02-25 PROCEDURE — 80053 COMPREHEN METABOLIC PANEL: CPT

## 2019-02-25 PROCEDURE — 85048 AUTOMATED LEUKOCYTE COUNT: CPT

## 2019-02-25 PROCEDURE — 82306 VITAMIN D 25 HYDROXY: CPT

## 2019-03-08 ENCOUNTER — APPOINTMENT (OUTPATIENT)
Dept: LAB | Facility: OTHER | Age: 44
End: 2019-03-08
Payer: COMMERCIAL

## 2019-03-08 ENCOUNTER — TRANSCRIBE ORDERS (OUTPATIENT)
Dept: LAB | Facility: OTHER | Age: 44
End: 2019-03-08

## 2019-03-08 DIAGNOSIS — F31.32 MODERATE DEPRESSED BIPOLAR I DISORDER (HCC): ICD-10-CM

## 2019-03-08 DIAGNOSIS — F31.32 MODERATE DEPRESSED BIPOLAR I DISORDER (HCC): Primary | ICD-10-CM

## 2019-03-08 LAB
ALBUMIN SERPL BCP-MCNC: 3.9 G/DL (ref 3.5–5)
ALP SERPL-CCNC: 34 U/L (ref 46–116)
ALT SERPL W P-5'-P-CCNC: 14 U/L (ref 12–78)
ANION GAP SERPL CALCULATED.3IONS-SCNC: 9 MMOL/L (ref 4–13)
AST SERPL W P-5'-P-CCNC: 8 U/L (ref 5–45)
BILIRUB SERPL-MCNC: 0.28 MG/DL (ref 0.2–1)
BUN SERPL-MCNC: 11 MG/DL (ref 5–25)
CALCIUM SERPL-MCNC: 8.7 MG/DL (ref 8.3–10.1)
CHLORIDE SERPL-SCNC: 105 MMOL/L (ref 100–108)
CO2 SERPL-SCNC: 25 MMOL/L (ref 21–32)
CREAT SERPL-MCNC: 0.72 MG/DL (ref 0.6–1.3)
GFR SERPL CREATININE-BSD FRML MDRD: 101 ML/MIN/1.73SQ M
GLUCOSE P FAST SERPL-MCNC: 77 MG/DL (ref 65–99)
POTASSIUM SERPL-SCNC: 4.4 MMOL/L (ref 3.5–5.3)
PROT SERPL-MCNC: 6.6 G/DL (ref 6.4–8.2)
SODIUM SERPL-SCNC: 139 MMOL/L (ref 136–145)
VALPROATE SERPL-MCNC: 44 UG/ML (ref 50–100)
WBC # BLD AUTO: 8.3 THOUSAND/UL (ref 4.31–10.16)

## 2019-03-08 PROCEDURE — 85048 AUTOMATED LEUKOCYTE COUNT: CPT

## 2019-03-08 PROCEDURE — 80053 COMPREHEN METABOLIC PANEL: CPT

## 2019-03-08 PROCEDURE — 36415 COLL VENOUS BLD VENIPUNCTURE: CPT

## 2019-03-08 PROCEDURE — 80164 ASSAY DIPROPYLACETIC ACD TOT: CPT

## 2019-04-08 ENCOUNTER — TRANSCRIBE ORDERS (OUTPATIENT)
Dept: LAB | Facility: OTHER | Age: 44
End: 2019-04-08

## 2019-04-08 ENCOUNTER — APPOINTMENT (OUTPATIENT)
Dept: LAB | Age: 44
End: 2019-04-08
Payer: COMMERCIAL

## 2019-04-08 ENCOUNTER — APPOINTMENT (OUTPATIENT)
Dept: URGENT CARE | Age: 44
End: 2019-04-08

## 2019-04-08 ENCOUNTER — APPOINTMENT (OUTPATIENT)
Dept: LAB | Facility: OTHER | Age: 44
End: 2019-04-08
Payer: COMMERCIAL

## 2019-04-08 DIAGNOSIS — F31.32 MODERATE DEPRESSED BIPOLAR I DISORDER (HCC): ICD-10-CM

## 2019-04-08 DIAGNOSIS — Z02.1 PRE-EMPLOYMENT EXAMINATION: ICD-10-CM

## 2019-04-08 DIAGNOSIS — Z02.1 PRE-EMPLOYMENT EXAMINATION: Primary | ICD-10-CM

## 2019-04-08 LAB
ALBUMIN SERPL BCP-MCNC: 3.7 G/DL (ref 3.5–5)
ALP SERPL-CCNC: 33 U/L (ref 46–116)
ALT SERPL W P-5'-P-CCNC: 14 U/L (ref 12–78)
ANION GAP SERPL CALCULATED.3IONS-SCNC: 3 MMOL/L (ref 4–13)
AST SERPL W P-5'-P-CCNC: 9 U/L (ref 5–45)
BILIRUB SERPL-MCNC: 0.29 MG/DL (ref 0.2–1)
BUN SERPL-MCNC: 10 MG/DL (ref 5–25)
CALCIUM SERPL-MCNC: 8.8 MG/DL (ref 8.3–10.1)
CHLORIDE SERPL-SCNC: 106 MMOL/L (ref 100–108)
CO2 SERPL-SCNC: 26 MMOL/L (ref 21–32)
CREAT SERPL-MCNC: 0.99 MG/DL (ref 0.6–1.3)
FERRITIN SERPL-MCNC: 199 NG/ML (ref 8–388)
GFR SERPL CREATININE-BSD FRML MDRD: 69 ML/MIN/1.73SQ M
GLUCOSE P FAST SERPL-MCNC: 114 MG/DL (ref 65–99)
POTASSIUM SERPL-SCNC: 4.2 MMOL/L (ref 3.5–5.3)
PROT SERPL-MCNC: 6.9 G/DL (ref 6.4–8.2)
RUBV IGG SERPL IA-ACNC: >175 IU/ML
SODIUM SERPL-SCNC: 135 MMOL/L (ref 136–145)
VALPROATE SERPL-MCNC: 50 UG/ML (ref 50–100)
WBC # BLD AUTO: 7.65 THOUSAND/UL (ref 4.31–10.16)

## 2019-04-08 PROCEDURE — 85048 AUTOMATED LEUKOCYTE COUNT: CPT

## 2019-04-08 PROCEDURE — 36415 COLL VENOUS BLD VENIPUNCTURE: CPT

## 2019-04-08 PROCEDURE — 80164 ASSAY DIPROPYLACETIC ACD TOT: CPT

## 2019-04-08 PROCEDURE — 82728 ASSAY OF FERRITIN: CPT

## 2019-04-08 PROCEDURE — 86480 TB TEST CELL IMMUN MEASURE: CPT

## 2019-04-08 PROCEDURE — 86735 MUMPS ANTIBODY: CPT

## 2019-04-08 PROCEDURE — 80053 COMPREHEN METABOLIC PANEL: CPT

## 2019-04-08 PROCEDURE — 86765 RUBEOLA ANTIBODY: CPT

## 2019-04-08 PROCEDURE — 86787 VARICELLA-ZOSTER ANTIBODY: CPT

## 2019-04-08 PROCEDURE — 86762 RUBELLA ANTIBODY: CPT

## 2019-04-09 LAB
MEV IGG SER QL: NORMAL
MUV IGG SER QL: NORMAL

## 2019-04-10 LAB
GAMMA INTERFERON BACKGROUND BLD IA-ACNC: 0.02 IU/ML
M TB IFN-G BLD-IMP: NEGATIVE
M TB IFN-G CD4+ BCKGRND COR BLD-ACNC: 0 IU/ML
M TB IFN-G CD4+ BCKGRND COR BLD-ACNC: 0 IU/ML
MITOGEN IGNF BCKGRD COR BLD-ACNC: >10 IU/ML

## 2019-04-11 LAB — VZV IGG SER IA-ACNC: NORMAL

## 2019-05-08 ENCOUNTER — TELEPHONE (OUTPATIENT)
Dept: PSYCHIATRY | Facility: CLINIC | Age: 44
End: 2019-05-08

## 2019-06-06 ENCOUNTER — TELEPHONE (OUTPATIENT)
Dept: PSYCHIATRY | Facility: CLINIC | Age: 44
End: 2019-06-06

## 2019-06-10 ENCOUNTER — OFFICE VISIT (OUTPATIENT)
Dept: PSYCHIATRY | Facility: CLINIC | Age: 44
End: 2019-06-10
Payer: COMMERCIAL

## 2019-06-10 VITALS
HEIGHT: 62 IN | BODY MASS INDEX: 27.86 KG/M2 | SYSTOLIC BLOOD PRESSURE: 103 MMHG | DIASTOLIC BLOOD PRESSURE: 69 MMHG | HEART RATE: 69 BPM | WEIGHT: 151.4 LBS

## 2019-06-10 DIAGNOSIS — F31.32 BIPOLAR DISORDER, CURRENT EPISODE DEPRESSED, MODERATE (HCC): Primary | ICD-10-CM

## 2019-06-10 PROCEDURE — 90792 PSYCH DIAG EVAL W/MED SRVCS: CPT | Performed by: NURSE PRACTITIONER

## 2019-06-10 RX ORDER — TRAZODONE HYDROCHLORIDE 100 MG/1
100 TABLET ORAL
Qty: 30 TABLET | Refills: 0 | Status: SHIPPED | OUTPATIENT
Start: 2019-06-10 | End: 2019-08-02 | Stop reason: SDUPTHER

## 2019-06-10 RX ORDER — LITHIUM CARBONATE 300 MG/1
300 TABLET, FILM COATED, EXTENDED RELEASE ORAL
Qty: 30 TABLET | Refills: 0 | Status: SHIPPED | OUTPATIENT
Start: 2019-06-10 | End: 2019-08-16 | Stop reason: HOSPADM

## 2019-06-10 RX ORDER — CLONAZEPAM 0.5 MG/1
0.5 TABLET, ORALLY DISINTEGRATING ORAL DAILY PRN
Qty: 30 TABLET | Refills: 0
Start: 2019-06-10 | End: 2019-07-10 | Stop reason: SDUPTHER

## 2019-06-10 RX ORDER — LITHIUM CARBONATE 450 MG
450 TABLET, EXTENDED RELEASE ORAL DAILY
Qty: 30 TABLET | Refills: 0 | Status: SHIPPED | OUTPATIENT
Start: 2019-06-10 | End: 2019-08-16 | Stop reason: HOSPADM

## 2019-06-10 RX ORDER — DIVALPROEX SODIUM 500 MG/1
500 TABLET, EXTENDED RELEASE ORAL 2 TIMES DAILY
Qty: 60 TABLET | Refills: 0 | Status: SHIPPED | OUTPATIENT
Start: 2019-06-10 | End: 2019-08-16 | Stop reason: HOSPADM

## 2019-06-26 ENCOUNTER — DOCUMENTATION (OUTPATIENT)
Dept: PSYCHIATRY | Facility: CLINIC | Age: 44
End: 2019-06-26

## 2019-07-09 ENCOUNTER — TELEPHONE (OUTPATIENT)
Dept: PSYCHIATRY | Facility: CLINIC | Age: 44
End: 2019-07-09

## 2019-07-10 DIAGNOSIS — F31.32 BIPOLAR DISORDER, CURRENT EPISODE DEPRESSED, MODERATE (HCC): ICD-10-CM

## 2019-07-10 RX ORDER — CLONAZEPAM 0.5 MG/1
0.5 TABLET, ORALLY DISINTEGRATING ORAL DAILY PRN
Qty: 30 TABLET | Refills: 0
Start: 2019-07-10 | End: 2019-07-11 | Stop reason: SDUPTHER

## 2019-07-10 NOTE — TELEPHONE ENCOUNTER
In checking the medication log it looks as though it was printed  Did you want to send it to the pharmacy or have her come here to  the prescription?

## 2019-07-10 NOTE — TELEPHONE ENCOUNTER
Patient requested her prescription go to her pharmacy and asked that it be sent as soon as possible  Thank you

## 2019-07-11 DIAGNOSIS — F31.32 BIPOLAR DISORDER, CURRENT EPISODE DEPRESSED, MODERATE (HCC): ICD-10-CM

## 2019-07-11 RX ORDER — CLONAZEPAM 0.5 MG/1
0.5 TABLET, ORALLY DISINTEGRATING ORAL DAILY PRN
Qty: 30 TABLET | Refills: 0 | Status: SHIPPED | OUTPATIENT
Start: 2019-07-11 | End: 2019-07-17 | Stop reason: SDUPTHER

## 2019-07-16 NOTE — TELEPHONE ENCOUNTER
Please send refills to [facility]  [facility] called today [facility]  I explained to her [facility]'s [facility]   So I deleted that pharmacy and now only [facility] is available

## 2019-07-17 DIAGNOSIS — F31.32 BIPOLAR DISORDER, CURRENT EPISODE DEPRESSED, MODERATE (HCC): ICD-10-CM

## 2019-07-17 RX ORDER — CLONAZEPAM 0.5 MG/1
0.5 TABLET, ORALLY DISINTEGRATING ORAL DAILY PRN
Qty: 30 TABLET | Refills: 0 | Status: SHIPPED | OUTPATIENT
Start: 2019-07-17 | End: 2019-08-02 | Stop reason: CLARIF

## 2019-08-02 ENCOUNTER — OFFICE VISIT (OUTPATIENT)
Dept: PSYCHIATRY | Facility: CLINIC | Age: 44
End: 2019-08-02
Payer: COMMERCIAL

## 2019-08-02 VITALS — HEART RATE: 61 BPM | DIASTOLIC BLOOD PRESSURE: 74 MMHG | SYSTOLIC BLOOD PRESSURE: 106 MMHG

## 2019-08-02 DIAGNOSIS — F41.1 GAD (GENERALIZED ANXIETY DISORDER): ICD-10-CM

## 2019-08-02 DIAGNOSIS — Z79.899 HIGH RISK MEDICATION USE: Primary | ICD-10-CM

## 2019-08-02 DIAGNOSIS — F43.10 PTSD (POST-TRAUMATIC STRESS DISORDER): ICD-10-CM

## 2019-08-02 DIAGNOSIS — F31.32 BIPOLAR DISORDER, CURRENT EPISODE DEPRESSED, MODERATE (HCC): ICD-10-CM

## 2019-08-02 PROCEDURE — 99214 OFFICE O/P EST MOD 30 MIN: CPT | Performed by: NURSE PRACTITIONER

## 2019-08-02 PROCEDURE — 90833 PSYTX W PT W E/M 30 MIN: CPT | Performed by: NURSE PRACTITIONER

## 2019-08-02 RX ORDER — CLONAZEPAM 0.5 MG/1
0.5 TABLET ORAL DAILY PRN
Qty: 45 TABLET | Refills: 0 | Status: SHIPPED | OUTPATIENT
Start: 2019-08-16 | End: 2019-08-16 | Stop reason: HOSPADM

## 2019-08-02 RX ORDER — PRAZOSIN HYDROCHLORIDE 1 MG/1
1 CAPSULE ORAL
Qty: 30 CAPSULE | Refills: 0 | Status: SHIPPED | OUTPATIENT
Start: 2019-08-02 | End: 2019-08-16 | Stop reason: HOSPADM

## 2019-08-02 RX ORDER — TRAZODONE HYDROCHLORIDE 100 MG/1
150 TABLET ORAL
Qty: 30 TABLET | Refills: 0 | Status: SHIPPED | OUTPATIENT
Start: 2019-08-02 | End: 2019-08-16 | Stop reason: HOSPADM
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
## 2019-08-03 ENCOUNTER — APPOINTMENT (OUTPATIENT)
Dept: LAB | Age: 44
End: 2019-08-03
Payer: COMMERCIAL

## 2019-08-03 DIAGNOSIS — Z79.899 HIGH RISK MEDICATION USE: ICD-10-CM

## 2019-08-03 LAB
25(OH)D3 SERPL-MCNC: 39.7 NG/ML (ref 30–100)
ALBUMIN SERPL BCP-MCNC: 3.5 G/DL (ref 3.5–5)
ALP SERPL-CCNC: 39 U/L (ref 46–116)
ALT SERPL W P-5'-P-CCNC: 11 U/L (ref 12–78)
ANION GAP SERPL CALCULATED.3IONS-SCNC: 5 MMOL/L (ref 4–13)
AST SERPL W P-5'-P-CCNC: 8 U/L (ref 5–45)
BASOPHILS # BLD AUTO: 0.05 THOUSANDS/ΜL (ref 0–0.1)
BASOPHILS NFR BLD AUTO: 1 % (ref 0–1)
BILIRUB SERPL-MCNC: 0.31 MG/DL (ref 0.2–1)
BUN SERPL-MCNC: 7 MG/DL (ref 5–25)
CALCIUM SERPL-MCNC: 8.5 MG/DL (ref 8.3–10.1)
CHLORIDE SERPL-SCNC: 108 MMOL/L (ref 100–108)
CHOLEST SERPL-MCNC: 175 MG/DL (ref 50–200)
CO2 SERPL-SCNC: 28 MMOL/L (ref 21–32)
CREAT SERPL-MCNC: 0.9 MG/DL (ref 0.6–1.3)
EOSINOPHIL # BLD AUTO: 0.19 THOUSAND/ΜL (ref 0–0.61)
EOSINOPHIL NFR BLD AUTO: 3 % (ref 0–6)
ERYTHROCYTE [DISTWIDTH] IN BLOOD BY AUTOMATED COUNT: 12.3 % (ref 11.6–15.1)
GFR SERPL CREATININE-BSD FRML MDRD: 77 ML/MIN/1.73SQ M
GLUCOSE P FAST SERPL-MCNC: 80 MG/DL (ref 65–99)
HCT VFR BLD AUTO: 41.5 % (ref 34.8–46.1)
HDLC SERPL-MCNC: 86 MG/DL (ref 40–60)
HGB BLD-MCNC: 12.9 G/DL (ref 11.5–15.4)
IMM GRANULOCYTES # BLD AUTO: 0.02 THOUSAND/UL (ref 0–0.2)
IMM GRANULOCYTES NFR BLD AUTO: 0 % (ref 0–2)
LDLC SERPL CALC-MCNC: 72 MG/DL (ref 0–100)
LITHIUM SERPL-SCNC: 0.9 MMOL/L (ref 0.5–1)
LYMPHOCYTES # BLD AUTO: 1.93 THOUSANDS/ΜL (ref 0.6–4.47)
LYMPHOCYTES NFR BLD AUTO: 31 % (ref 14–44)
MCH RBC QN AUTO: 31.5 PG (ref 26.8–34.3)
MCHC RBC AUTO-ENTMCNC: 31.1 G/DL (ref 31.4–37.4)
MCV RBC AUTO: 102 FL (ref 82–98)
MONOCYTES # BLD AUTO: 0.39 THOUSAND/ΜL (ref 0.17–1.22)
MONOCYTES NFR BLD AUTO: 6 % (ref 4–12)
NEUTROPHILS # BLD AUTO: 3.57 THOUSANDS/ΜL (ref 1.85–7.62)
NEUTS SEG NFR BLD AUTO: 59 % (ref 43–75)
NRBC BLD AUTO-RTO: 0 /100 WBCS
PLATELET # BLD AUTO: 255 THOUSANDS/UL (ref 149–390)
PMV BLD AUTO: 10.2 FL (ref 8.9–12.7)
POTASSIUM SERPL-SCNC: 4.4 MMOL/L (ref 3.5–5.3)
PROT SERPL-MCNC: 7 G/DL (ref 6.4–8.2)
RBC # BLD AUTO: 4.09 MILLION/UL (ref 3.81–5.12)
SODIUM SERPL-SCNC: 141 MMOL/L (ref 136–145)
TRIGL SERPL-MCNC: 83 MG/DL
VALPROATE SERPL-MCNC: 87 UG/ML (ref 50–100)
WBC # BLD AUTO: 6.15 THOUSAND/UL (ref 4.31–10.16)

## 2019-08-03 PROCEDURE — 80053 COMPREHEN METABOLIC PANEL: CPT

## 2019-08-03 PROCEDURE — 80164 ASSAY DIPROPYLACETIC ACD TOT: CPT

## 2019-08-03 PROCEDURE — 82306 VITAMIN D 25 HYDROXY: CPT

## 2019-08-03 PROCEDURE — 80178 ASSAY OF LITHIUM: CPT

## 2019-08-03 PROCEDURE — 85025 COMPLETE CBC W/AUTO DIFF WBC: CPT

## 2019-08-03 PROCEDURE — 36415 COLL VENOUS BLD VENIPUNCTURE: CPT

## 2019-08-03 PROCEDURE — 80061 LIPID PANEL: CPT

## 2019-08-05 ENCOUNTER — OFFICE VISIT (OUTPATIENT)
Dept: PSYCHIATRY | Facility: CLINIC | Age: 44
End: 2019-08-05
Payer: COMMERCIAL

## 2019-08-05 ENCOUNTER — DOCUMENTATION (OUTPATIENT)
Dept: PSYCHIATRY | Facility: CLINIC | Age: 44
End: 2019-08-05

## 2019-08-05 ENCOUNTER — HOSPITAL ENCOUNTER (EMERGENCY)
Facility: HOSPITAL | Age: 44
Discharge: OTHER INSTITUTION | End: 2019-08-06
Attending: EMERGENCY MEDICINE | Admitting: EMERGENCY MEDICINE
Payer: COMMERCIAL

## 2019-08-05 VITALS
RESPIRATION RATE: 16 BRPM | HEIGHT: 63 IN | BODY MASS INDEX: 27.57 KG/M2 | HEART RATE: 85 BPM | WEIGHT: 155.6 LBS | DIASTOLIC BLOOD PRESSURE: 73 MMHG | SYSTOLIC BLOOD PRESSURE: 109 MMHG

## 2019-08-05 DIAGNOSIS — F10.20 ALCOHOL USE DISORDER, MODERATE, IN CONTROLLED ENVIRONMENT (HCC): Primary | ICD-10-CM

## 2019-08-05 DIAGNOSIS — F31.5: ICD-10-CM

## 2019-08-05 DIAGNOSIS — F31.5: Primary | ICD-10-CM

## 2019-08-05 DIAGNOSIS — R45.851 SUICIDAL IDEATIONS: ICD-10-CM

## 2019-08-05 LAB
AMPHETAMINES SERPL QL SCN: NEGATIVE
BARBITURATES UR QL: NEGATIVE
BENZODIAZ UR QL: NEGATIVE
COCAINE UR QL: NEGATIVE
ETHANOL EXG-MCNC: 0 MG/DL
EXT PREG TEST URINE: NEGATIVE
EXT. CONTROL ED NAV: NORMAL
METHADONE UR QL: NEGATIVE
OPIATES UR QL SCN: NEGATIVE
PCP UR QL: NEGATIVE
THC UR QL: NEGATIVE

## 2019-08-05 PROCEDURE — 81025 URINE PREGNANCY TEST: CPT | Performed by: EMERGENCY MEDICINE

## 2019-08-05 PROCEDURE — 99285 EMERGENCY DEPT VISIT HI MDM: CPT

## 2019-08-05 PROCEDURE — 96127 BRIEF EMOTIONAL/BEHAV ASSMT: CPT | Performed by: NURSE PRACTITIONER

## 2019-08-05 PROCEDURE — 99214 OFFICE O/P EST MOD 30 MIN: CPT | Performed by: NURSE PRACTITIONER

## 2019-08-05 PROCEDURE — 80307 DRUG TEST PRSMV CHEM ANLYZR: CPT | Performed by: EMERGENCY MEDICINE

## 2019-08-05 PROCEDURE — 99285 EMERGENCY DEPT VISIT HI MDM: CPT | Performed by: EMERGENCY MEDICINE

## 2019-08-05 PROCEDURE — 82075 ASSAY OF BREATH ETHANOL: CPT | Performed by: EMERGENCY MEDICINE

## 2019-08-05 RX ORDER — BENZTROPINE MESYLATE 1 MG/ML
1 INJECTION INTRAMUSCULAR; INTRAVENOUS EVERY 6 HOURS PRN
Status: CANCELLED | OUTPATIENT
Start: 2019-08-05

## 2019-08-05 RX ORDER — ACETAMINOPHEN 325 MG/1
650 TABLET ORAL EVERY 6 HOURS PRN
Status: CANCELLED | OUTPATIENT
Start: 2019-08-05

## 2019-08-05 RX ORDER — LORAZEPAM 0.5 MG/1
1 TABLET ORAL EVERY 6 HOURS PRN
Status: CANCELLED | OUTPATIENT
Start: 2019-08-05

## 2019-08-05 RX ORDER — LORAZEPAM 2 MG/ML
1 INJECTION INTRAMUSCULAR EVERY 6 HOURS PRN
Status: CANCELLED | OUTPATIENT
Start: 2019-08-05

## 2019-08-05 RX ORDER — HALOPERIDOL 5 MG/ML
5 INJECTION INTRAMUSCULAR EVERY 6 HOURS PRN
Status: CANCELLED | OUTPATIENT
Start: 2019-08-05

## 2019-08-05 RX ORDER — MAGNESIUM HYDROXIDE/ALUMINUM HYDROXICE/SIMETHICONE 120; 1200; 1200 MG/30ML; MG/30ML; MG/30ML
30 SUSPENSION ORAL EVERY 4 HOURS PRN
Status: CANCELLED | OUTPATIENT
Start: 2019-08-05

## 2019-08-05 RX ORDER — BENZTROPINE MESYLATE 1 MG/1
1 TABLET ORAL EVERY 6 HOURS PRN
Status: CANCELLED | OUTPATIENT
Start: 2019-08-05

## 2019-08-05 RX ORDER — TRAZODONE HYDROCHLORIDE 50 MG/1
50 TABLET ORAL
Status: CANCELLED | OUTPATIENT
Start: 2019-08-05

## 2019-08-05 RX ORDER — HALOPERIDOL 5 MG
5 TABLET ORAL EVERY 6 HOURS PRN
Status: CANCELLED | OUTPATIENT
Start: 2019-08-05

## 2019-08-05 NOTE — ED NOTES
Pt's belongings inventoried and placed in [address] closet shelf A:    Black scrub top  Black scrub pants  Black undershirt  Socks  Shoes  Car keys  Cell phone  Ulysses Rubio  $[phone] rodrigues       Ifeanyi Andre  08/05/19 1930

## 2019-08-05 NOTE — PROGRESS NOTES
Called Jamee Vázquez to verify she should see an attending for follow-up  While on the phone she asked if I could call her back with an appointment with a new provider  As I was consulting with Bib Samuels and Dr Ketan Rutherford about who we could schedule her with she showed up at the office  She was very anxious and say she wanted to be seen today  She was offered may appointment times for today and tomorrow and refused them all at first  She then decided to take the 230 appointment, with one of the AP if she got a doctor note  She also  ask if at the appointment that PHP be explained to her and if she could still see an attending provider  She was schedule at 230 pm today with the AP

## 2019-08-05 NOTE — ED ATTENDING ATTESTATION
Aury Valle MD, saw and evaluated the patient  I have discussed the patient with the resident/non-physician practitioner and agree with the resident's/non-physician practitioner's findings, Plan of Care, and MDM as documented in the resident's/non-physician practitioner's note, except where noted  All available labs and Radiology studies were reviewed  I was present for key portions of any procedure(s) performed by the resident/non-physician practitioner and I was immediately available to provide assistance  At this point I agree with the current assessment done in the Emergency Department    I have conducted an independent evaluation of this patient a history and physical is as follows:    C/o wants to hurt self  Suicidal ideations    No physical c/o    H/o bipolar   On lithium    No ha no fever no cp    occ social etoh  No drug use   Exam looks well nad   Lungs clear heart rrr no m abd soft nt   No hallucinations    Critical Care Time  Procedures

## 2019-08-05 NOTE — ED PROVIDER NOTES
History  Chief Complaint   Patient presents with    Psychiatric Evaluation     SI with a plan, pt states she always has thoughts of hurting herself but this is worse than usual      45yo presenting with suicidal ideations which she said have gotten more intense over the last 24hours and she has a plan to do so  Denies any homicidal ideation or desire to hurt others  Denies any pain, injury, headache, fever, chills, urinary or bowel symptoms, intoxication or drug use  Prior to Admission Medications   Prescriptions Last Dose Informant Patient Reported? Taking?    Acetaminophen 500 MG   Yes No   Sig: Take by mouth 4 times daily   cholecalciferol (VITAMIN D3) 84905 units capsule  Self Yes No   Sig: Take 10,000 Units by mouth once a week   clonazePAM (KlonoPIN) 0 5 mg tablet   No No   Sig: Take 1 tablet (0 5 mg total) by mouth daily as needed for anxiety or seizures   cyanocobalamin (VITAMIN B-12) 1,000 mcg tablet   Yes No   Sig: Take 1 tablet by mouth daily   divalproex sodium (DEPAKOTE ER) 500 mg 24 hr tablet   No No   Sig: Take 1 tablet (500 mg total) by mouth 2 (two) times a day   lithium carbonate (LITHOBID) 300 mg CR tablet   No No   Sig: Take 1 tablet (300 mg total) by mouth daily at bedtime   lithium carbonate (LITHOBID) 450 mg CR tablet   No No   Sig: Take 1 tablet (450 mg total) by mouth daily   loratadine (CLARITIN) 10 mg tablet   Yes No   Sig: Take 10 mg by mouth daily as needed    prazosin (MINIPRESS) 1 mg capsule   No No   Sig: Take 1 capsule (1 mg total) by mouth daily at bedtime   traZODone (DESYREL) 100 mg tablet   No No   Sig: Take 1 5 tablets (150 mg total) by mouth daily at bedtime      Facility-Administered Medications: None       Past Medical History:   Diagnosis Date    [facility]     Alcohol abuse     [facility]     Anemia due to vitamin B12 deficiency     last assessed 10/10/17, iron deficiency anemia    Bipolar affective disorder (HCC)     current episode mixed, current episode severity unspecified, last assessed 3/8/17    Depression     Hallucination     Psychosis [facility]     Pyelonephritis     Self-injurious behavior     Sleep difficulties     Suicide attempt ([facility] )     Vitamin D deficiency        Past Surgical History:   Procedure Laterality Date    TOTAL ABDOMINAL HYSTERECTOMY      TUBAL LIGATION      WISDOM TOOTH EXTRACTION         Family History   Problem Relation Age of Onset    Hypertension Mother     Depression Mother     Anxiety disorder Mother     Neuropathy Mother     ADD / ADHD Father     Hypertension Father     ADD / ADHD Son     Asthma Son     Asthma Daughter     Anxiety disorder Daughter     Alcohol abuse Sister     No Known Problems Brother     Hypertension Maternal Grandmother     Stroke Maternal Grandmother     Dementia Maternal Grandmother     Stomach cancer Maternal Grandfather     Colon cancer Maternal Grandfather 66    Stroke Paternal Grandfather     Depression Brother     Anxiety disorder Brother     Alcohol abuse Brother     Depression Son     No Known Problems Paternal Grandmother     Depression Brother     Alcohol abuse Brother     No Known Problems Brother     Ovarian cancer Other      I have reviewed and agree with the history as documented  Social History     Tobacco Use    Smoking status: Former Smoker     Types: Cigarettes     Last attempt to quit: 2018     Years since quittin 9    Smokeless tobacco: Never Used    Tobacco comment: 1-3 cigs/day-11 total   Substance Use Topics    Alcohol use: Yes     Alcohol/week: 2 0 - 5 0 standard drinks     Types: 2 - 5 Cans of beer per week     Frequency: 2-3 times a week     Drinks per session: 3 or 4     Comment: drinking 2-5 beers at a time 2-3 X per week    Drug use: Not Currently        Review of Systems   Constitutional: Negative for activity change, chills, fatigue and fever  HENT: Negative for ear pain, rhinorrhea and tinnitus      Eyes: Negative for pain and visual disturbance  Respiratory: Negative for apnea, cough, chest tightness, shortness of breath and wheezing  Cardiovascular: Negative for chest pain, palpitations and leg swelling  Gastrointestinal: Negative for abdominal pain, diarrhea, nausea and vomiting  Genitourinary: Negative for dysuria, flank pain and pelvic pain  Musculoskeletal: Negative for arthralgias and myalgias  Skin: Negative for rash and wound  Neurological: Negative for dizziness, syncope, weakness, numbness and headaches  Psychiatric/Behavioral: Positive for suicidal ideas  Negative for self-injury  All other systems reviewed and are negative  Physical Exam  ED Triage Vitals [08/05/19 1656]   Temperature Pulse Respirations Blood Pressure SpO2   98 5 °F (36 9 °C) 66 16 105/58 100 %      Temp Source Heart Rate Source Patient Position - Orthostatic VS BP Location FiO2 (%)   Oral Monitor Sitting Left arm --      Pain Score       No Pain             Orthostatic Vital Signs  Vitals:    08/05/19 1656 08/06/19 0032   BP: 105/58 96/52   Pulse: 66 68   Patient Position - Orthostatic VS: Sitting Lying       Physical Exam   Constitutional: She is oriented to person, place, and time  She appears well-developed and well-nourished  No distress  HENT:   Head: Normocephalic and atraumatic  Eyes: Conjunctivae and EOM are normal  Right eye exhibits no discharge  Left eye exhibits no discharge  No scleral icterus  Neck: Normal range of motion  Neck supple  No JVD present  No tracheal deviation present  Cardiovascular: Normal rate, regular rhythm, normal heart sounds and intact distal pulses  Exam reveals no gallop and no friction rub  No murmur heard  Pulmonary/Chest: Effort normal and breath sounds normal  No stridor  No respiratory distress  She has no wheezes  She has no rales  She exhibits no tenderness  Abdominal: Soft  There is no tenderness  Musculoskeletal: Normal range of motion   She exhibits no edema, tenderness or deformity  Neurological: She is alert and oriented to person, place, and time  No sensory deficit  She exhibits normal muscle tone  Skin: Skin is warm  Capillary refill takes less than 2 seconds  No rash noted  She is not diaphoretic  No erythema  Psychiatric: She has a normal mood and affect  Her behavior is normal  Judgment normal  Thought content is not delusional  She expresses suicidal ideation  She expresses no homicidal ideation  She expresses suicidal plans  She expresses no homicidal plans  Nursing note and vitals reviewed  ED Medications  Medications - No data to display    Diagnostic Studies  Results Reviewed     Procedure Component Value Units Date/Time    POCT pregnancy, urine [[id number]]  (Normal) Resulted:  08/05/19 2104    Lab Status:  Final result Updated:  08/05/19 2104     EXT PREG TEST UR (Ref: Negative) negative     Control valid    Rapid drug screen, urine [[id number]]  (Normal) Collected:  08/05/19 1645    Lab Status:  Final result Specimen:  Urine, Clean Catch Updated:  08/05/19 1708     Amph/Meth UR Negative     Barbiturate Ur Negative     Benzodiazepine Urine Negative     Cocaine Urine Negative     Methadone Urine Negative     Opiate Urine Negative     PCP Ur Negative     THC Urine Negative    Narrative:       FOR MEDICAL PURPOSES ONLY  IF CONFIRMATION NEEDED PLEASE CONTACT THE LAB WITHIN 5 DAYS      Drug Screen Cutoff Levels:  AMPHETAMINE/METHAMPHETAMINES  1000 ng/mL  BARBITURATES     200 ng/mL  BENZODIAZEPINES     200 ng/mL  COCAINE      300 ng/mL  METHADONE      300 ng/mL  OPIATES      300 ng/mL  PHENCYCLIDINE     25 ng/mL  THC       50 ng/mL      POCT alcohol breath test [[id number]]  (Normal) Resulted:  08/05/19 1647    Lab Status:  Final result Updated:  08/05/19 1647     EXTBreath Alcohol 0 00                 No orders to display         Procedures  Procedures        ED Course  ED Course as of Aug 06 1755   [facility] Aug 05, 2019   1818 Crisis contacted; will evaluate patient                                  MDM    Disposition  Final diagnoses:   Suicidal ideations     Time reflects when diagnosis was documented in both MDM as applicable and the Disposition within this note     Time User Action Codes Description Comment    8/5/2019 11:36 PM Reynolds Salt Add [F31 5] Bipolar I disorder, moderate, current or most recent episode depressed, with psychotic features ([facility] )     8/5/2019 11:36 PM Reynolds Salt Modify [F31 5] Bipolar I disorder, moderate, current or most recent episode depressed, with psychotic features ([facility] )     8/6/2019  1:43 AM Nelly Lane Add [R45 851] Suicidal ideations       ED Disposition     ED Disposition Condition Date/Time Comment    Transfer to [address] [place] [address] Aug 6, 2019  1:43 AM Issa Palacio should be transferred out to [employer] [facility] and has been medically cleared          MD Documentation      Most Recent Value   Accepting Physician  Dr Asa Heard Name, Floyd Mclain MD, Dr  [address] [facility]      RN Documentation      Most [facility] Name, [address]   [facility]      Follow-up Information    None         Discharge Medication List as of 8/6/2019  2:10 AM      CONTINUE these medications which have NOT CHANGED    Details   Acetaminophen 500 MG Take by mouth 4 times daily, Historical Med      cholecalciferol (VITAMIN D3) 30898 units capsule Take 10,000 Units by mouth once a week, Historical Med      clonazePAM (KlonoPIN) 0 5 mg tablet Take 1 tablet (0 5 mg total) by mouth daily as needed for anxiety or seizures, Starting Fri 8/16/2019, Normal      divalproex sodium (DEPAKOTE ER) 500 mg 24 hr tablet Take 1 tablet (500 mg total) by mouth 2 (two) times a day, Starting Mon 6/10/2019, Normal      !! lithium carbonate (LITHOBID) 300 mg CR tablet Take 1 tablet (300 mg total) by mouth daily at bedtime, Starting Mon 6/10/2019, Normal      !! lithium carbonate (LITHOBID) 450 mg CR tablet Take 1 tablet (450 mg total) by mouth daily, Starting Mon 6/10/2019, Normal      traZODone (DESYREL) 100 mg tablet Take 1 5 tablets (150 mg total) by mouth daily at bedtime, Starting Fri 8/2/2019, Normal      cyanocobalamin (VITAMIN B-12) 1,000 mcg tablet Take 1 tablet by mouth daily, Starting Tue 10/10/2017, Historical Med      loratadine (CLARITIN) 10 mg tablet Take 10 mg by mouth daily as needed , Historical Med      prazosin (MINIPRESS) 1 mg capsule Take 1 capsule (1 mg total) by mouth daily at bedtime, Starting Fri 8/2/2019, Normal       !! - Potential duplicate medications found  Please discuss with provider  No discharge procedures on file  ED Provider  Attending physically available and evaluated [place]  I managed the patient along with the ED Attending      Electronically Signed by         Sunshine Calvillo DO  08/06/19 1202

## 2019-08-05 NOTE — LETTER
Section I - General Information    Name of Patient: Joshua Blakely                 : 1975    Medicare #:____________________  Transport Date: 19 (PCS is valid for round trips on this date and for all repetitive trips in the 60-day range as noted below )  Origin: [address]                                                         Destination:________________________________________________  Is the pt's stay covered under Medicare Part A (PPS/DRG)     (_) YES  (_) NO  Closest appropriate facility?  (_) YES  (_) NO  If no, why is transport to more distant facility required?________________________  If hosp-hosp transfer, describe services needed at 2nd facility not available at 1st facility? _________________________________  If hospice pt, is this transport related to pt's terminal illness? (_) YES (_) NO Describe____________________________________    Section II - Medical Necessity Questionnaire  Ambulance transportation is medically necessary only if other means of transport are contraindicated or would be potentially harmful to the patient  To meet this requirement, the patient must either be "bed confined" or suffer from a condition such that transport by means other than ambulance is contraindicated by the patient's condition   The following questions must be answered by the medical professional signing below for this form to be valid:    1)  Describe the MEDICAL CONDITION (physical and/or mental) of this patient AT [address] [place] [address] that requires the patient to be transported in an ambulance and why transport by other means is contraindicated by the patient's condition:__________________________________________________________________________________________________    2) Is the patient "bed confined" as defined below?     (_) YES  (_) NO  To be "be confined" the patient must satisfy all three of the following conditions: (1) unable to get up from bed without Assistance; AND (2) unable to ambulate; AND (3) unable to sit in a chair or wheelchair  3) Can this patient safely be transported by car or wheelchair Shital Codding (i e , seated during transport without a medical attendant or monitoring)?   (_) YES  (_) NO    4) In addition to completing questions 1-3 above, please check any of the following conditions that apply*:  *Note: supporting documentation for any boxes checked must be maintained in the patient's medical records  (_)Contractures   (_)Non-Healed Fractures  (_)Patient is confused (_)Patient is comatose (_)Moderate/severe pain on movement (_)Danger to self/others  (_)IV meds/fluids required (_)Patient is combative(_)Need or possible need for restraints (_)DVT requires elevation of lower extremity  (_)Medical attendant required (_)Requires oxygen-unable to self administer (_)Special handling/isolation/infection control precautions required (_)Unable to tolerate seated position for time needed to transport (_)Hemodynamic monitoring required en route (_)Unable to sit in a chair or wheelchair due to decubitus ulcers or other wounds (_)Cardiac monitoring required en route (_)Morbid obesity requires additional personnel/equipment to safely handle patient (_)Orthopedic device (backboard, halo, pins, traction, brace, wedge, etc,) requiring special handling during transport (_)Other(specify)_______________________________________________    Section III - Signature of Physician or Healthcare Professional  I certify that the above information is true and correct based on my evaluation of this patient, and represent that the patient requires transport by ambulance and that other forms of transport are contraindicated   I understand that this information will be used by the Centers for Medicare and Medicaid Services (CMS) to support the determination of medical necessity for ambulance services, and I represent that I have personal knowledge of the patient's condition at time of transport  (_) If this box is checked, I also certify that the patient is physically or mentally incapable of signing the ambulance service's claim and that the institution with which I am affiliated has furnished care, services, or assistance to the patient  My signature below is made on behalf of the patient pursuant to 42 CFR §424 36(b)(4)  In accordance with 42 CFR §424 37, the specific reason(s) that the patient is physically or mentally incapable of signing the claim form is as follows: _________________________________________________________________________________________________________      Signature of Physician* or Healthcare Professional______________________________________________________________  Signature Date 08/06/19 (For scheduled repetitive transports, this form is not valid for transports performed more than 60 days after this date)    Printed Name & Credentials of Physician or Healthcare Professional (MD, DO, RN, etc )________________________________  *Form must be signed by patient's attending physician for scheduled, repetitive transports   For non-repetitive, unscheduled ambulance transports, if unable to obtain the signature of the attending physician, any of the following may sign (choose appropriate option below)  (_) Physician Assistant (_)  Clinical Nurse Specialist (_)  Registered Nurse  (_)  Nurse Practitioner  (_) Discharge Planner

## 2019-08-05 NOTE — ED NOTES
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[place] [address], and reports that her job is a stressor for her, adding that she "doesn't want to be a RN anymore with the things she has experienced and seen"  Patient admits to a hx of inpatient admissions, since the age of 7  Patient's last admission was in 9/2018 at [facility]  Patient reports an increase in appetite which has resulted in weight gain, although is unsure of whether Depakote could be contributing to that as well  Patient admits that she drinks alcohol 2-3 times per week and considers herself a social drinker  Patient adds that she relapsed a few days ago by having a cigarette which she had quit this past fall  Patient endorses suicidal ideation with "several plans", adding that she's attempted suicide by overdose and cutting on several occasions since she was 7years old  Patient is currently linked with outpt tx at [facility], and reports that prior she had been linked with a psychiatrist that she had a great rapport with  Patient has no current cuts and is not endorsing any thoughts to harm others at this time  Patient does c/o poor sleep, stating that she has constant nightmares which prevents her from sleeping more than 1-2 hours per night  Discussed options with the patient who is willing to sign in voluntarily at this time, although will only go to [facility]  201 prepared      Chief Complaint   Patient presents with    Psychiatric Evaluation     SI with a plan, pt states she always has thoughts of hurting herself but this is worse than usual      Intake Assessment completed, Safety Risk Assessment completed      Misti Tate LMSW  08/05/19  [id number]
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
## 2019-08-06 ENCOUNTER — HOSPITAL ENCOUNTER (INPATIENT)
Facility: HOSPITAL | Age: 44
LOS: 10 days | Discharge: HOME | DRG: 885 | End: 2019-08-16
Attending: STUDENT IN AN ORGANIZED HEALTH CARE EDUCATION/TRAINING PROGRAM | Admitting: PSYCHIATRY & NEUROLOGY
Payer: COMMERCIAL

## 2019-08-06 VITALS
RESPIRATION RATE: 16 BRPM | TEMPERATURE: 98.5 F | SYSTOLIC BLOOD PRESSURE: 96 MMHG | HEART RATE: 68 BPM | DIASTOLIC BLOOD PRESSURE: 52 MMHG | OXYGEN SATURATION: 100 %

## 2019-08-06 DIAGNOSIS — F31.5: ICD-10-CM

## 2019-08-06 DIAGNOSIS — F31.5 BIPOLAR I DISORDER, SEVERE, CURRENT OR MOST RECENT EPISODE DEPRESSED, WITH PSYCHOTIC FEATURES (HCC): Primary | ICD-10-CM

## 2019-08-06 LAB
ALBUMIN SERPL BCP-MCNC: 3.5 G/DL (ref 3–5.2)
ALP SERPL-CCNC: 33 U/L (ref 43–122)
ALT SERPL W P-5'-P-CCNC: 9 U/L (ref 9–52)
ANION GAP SERPL CALCULATED.3IONS-SCNC: 5 MMOL/L (ref 5–14)
AST SERPL W P-5'-P-CCNC: 15 U/L (ref 14–36)
BASOPHILS # BLD AUTO: 0 THOUSANDS/ΜL (ref 0–0.1)
BASOPHILS NFR BLD AUTO: 1 % (ref 0–1)
BILIRUB SERPL-MCNC: 0.2 MG/DL
BUN SERPL-MCNC: 11 MG/DL (ref 5–25)
CALCIUM SERPL-MCNC: 8.7 MG/DL (ref 8.4–10.2)
CHLORIDE SERPL-SCNC: 107 MMOL/L (ref 97–108)
CO2 SERPL-SCNC: 27 MMOL/L (ref 22–30)
CREAT SERPL-MCNC: 0.74 MG/DL (ref 0.6–1.2)
EOSINOPHIL # BLD AUTO: 0.1 THOUSAND/ΜL (ref 0–0.4)
EOSINOPHIL NFR BLD AUTO: 2 % (ref 0–6)
ERYTHROCYTE [DISTWIDTH] IN BLOOD BY AUTOMATED COUNT: 12.9 %
GFR SERPL CREATININE-BSD FRML MDRD: 98 ML/MIN/1.73SQ M
GLUCOSE P FAST SERPL-MCNC: 76 MG/DL (ref 70–99)
GLUCOSE SERPL-MCNC: 76 MG/DL (ref 70–99)
HCT VFR BLD AUTO: 36.6 % (ref 36–46)
HGB BLD-MCNC: 12.1 G/DL (ref 12–16)
LYMPHOCYTES # BLD AUTO: 1.6 THOUSANDS/ΜL (ref 0.5–4)
LYMPHOCYTES NFR BLD AUTO: 31 % (ref 25–45)
MCH RBC QN AUTO: 31.8 PG (ref 26–34)
MCHC RBC AUTO-ENTMCNC: 33.2 G/DL (ref 31–36)
MCV RBC AUTO: 96 FL (ref 80–100)
MONOCYTES # BLD AUTO: 0.4 THOUSAND/ΜL (ref 0.2–0.9)
MONOCYTES NFR BLD AUTO: 7 % (ref 1–10)
NEUTROPHILS # BLD AUTO: 3.2 THOUSANDS/ΜL (ref 1.8–7.8)
NEUTS SEG NFR BLD AUTO: 59 % (ref 45–65)
PLATELET # BLD AUTO: 210 THOUSANDS/UL (ref 150–450)
PMV BLD AUTO: 8.7 FL (ref 8.9–12.7)
POTASSIUM SERPL-SCNC: 4.2 MMOL/L (ref 3.6–5)
PROT SERPL-MCNC: 6 G/DL (ref 5.9–8.4)
RBC # BLD AUTO: 3.81 MILLION/UL (ref 4–5.2)
SODIUM SERPL-SCNC: 139 MMOL/L (ref 137–147)
WBC # BLD AUTO: 5.4 THOUSAND/UL (ref 4.5–11)

## 2019-08-06 PROCEDURE — 85025 COMPLETE CBC W/AUTO DIFF WBC: CPT | Performed by: PSYCHIATRY & NEUROLOGY

## 2019-08-06 PROCEDURE — 99221 1ST HOSP IP/OBS SF/LOW 40: CPT | Performed by: STUDENT IN AN ORGANIZED HEALTH CARE EDUCATION/TRAINING PROGRAM

## 2019-08-06 PROCEDURE — 99253 IP/OBS CNSLTJ NEW/EST LOW 45: CPT | Performed by: HOSPITALIST

## 2019-08-06 PROCEDURE — 80053 COMPREHEN METABOLIC PANEL: CPT | Performed by: PSYCHIATRY & NEUROLOGY

## 2019-08-06 RX ORDER — BENZTROPINE MESYLATE 1 MG/1
1 TABLET ORAL EVERY 6 HOURS PRN
Status: DISCONTINUED | OUTPATIENT
Start: 2019-08-06 | End: 2019-08-16 | Stop reason: HOSPADM

## 2019-08-06 RX ORDER — MAGNESIUM HYDROXIDE/ALUMINUM HYDROXICE/SIMETHICONE 120; 1200; 1200 MG/30ML; MG/30ML; MG/30ML
30 SUSPENSION ORAL EVERY 4 HOURS PRN
Status: DISCONTINUED | OUTPATIENT
Start: 2019-08-06 | End: 2019-08-16 | Stop reason: HOSPADM

## 2019-08-06 RX ORDER — HALOPERIDOL 5 MG
5 TABLET ORAL EVERY 6 HOURS PRN
Status: DISCONTINUED | OUTPATIENT
Start: 2019-08-06 | End: 2019-08-16 | Stop reason: HOSPADM

## 2019-08-06 RX ORDER — TRAZODONE HYDROCHLORIDE 50 MG/1
50 TABLET ORAL
Status: DISCONTINUED | OUTPATIENT
Start: 2019-08-06 | End: 2019-08-16 | Stop reason: HOSPADM

## 2019-08-06 RX ORDER — LITHIUM CARBONATE 450 MG
450 TABLET, EXTENDED RELEASE ORAL
Status: DISCONTINUED | OUTPATIENT
Start: 2019-08-06 | End: 2019-08-16 | Stop reason: HOSPADM

## 2019-08-06 RX ORDER — DIVALPROEX SODIUM 500 MG/1
500 TABLET, DELAYED RELEASE ORAL 2 TIMES DAILY
Status: DISCONTINUED | OUTPATIENT
Start: 2019-08-06 | End: 2019-08-16 | Stop reason: HOSPADM

## 2019-08-06 RX ORDER — ACETAMINOPHEN 325 MG/1
650 TABLET ORAL EVERY 6 HOURS PRN
Status: DISCONTINUED | OUTPATIENT
Start: 2019-08-06 | End: 2019-08-16 | Stop reason: HOSPADM

## 2019-08-06 RX ORDER — BENZTROPINE MESYLATE 1 MG/ML
1 INJECTION INTRAMUSCULAR; INTRAVENOUS EVERY 6 HOURS PRN
Status: DISCONTINUED | OUTPATIENT
Start: 2019-08-06 | End: 2019-08-16 | Stop reason: HOSPADM

## 2019-08-06 RX ORDER — LITHIUM CARBONATE 300 MG/1
300 TABLET, FILM COATED, EXTENDED RELEASE ORAL DAILY
Status: DISCONTINUED | OUTPATIENT
Start: 2019-08-06 | End: 2019-08-16 | Stop reason: HOSPADM

## 2019-08-06 RX ORDER — LORAZEPAM 2 MG/ML
1 INJECTION INTRAMUSCULAR EVERY 6 HOURS PRN
Status: DISCONTINUED | OUTPATIENT
Start: 2019-08-06 | End: 2019-08-16 | Stop reason: HOSPADM

## 2019-08-06 RX ORDER — LORAZEPAM 1 MG/1
1 TABLET ORAL EVERY 6 HOURS PRN
Status: DISCONTINUED | OUTPATIENT
Start: 2019-08-06 | End: 2019-08-16 | Stop reason: HOSPADM

## 2019-08-06 RX ORDER — HALOPERIDOL 5 MG/ML
5 INJECTION INTRAMUSCULAR EVERY 6 HOURS PRN
Status: DISCONTINUED | OUTPATIENT
Start: 2019-08-06 | End: 2019-08-16 | Stop reason: HOSPADM

## 2019-08-06 RX ADMIN — DIVALPROEX SODIUM 500 MG: 500 TABLET, DELAYED RELEASE ORAL at 09:45

## 2019-08-06 RX ADMIN — LITHIUM CARBONATE 450 MG: 450 TABLET, EXTENDED RELEASE ORAL at 21:09

## 2019-08-06 RX ADMIN — LITHIUM CARBONATE 300 MG: 300 TABLET, FILM COATED, EXTENDED RELEASE ORAL at 09:45

## 2019-08-06 RX ADMIN — ACETAMINOPHEN 650 MG: 325 TABLET ORAL at 13:53

## 2019-08-06 RX ADMIN — LORAZEPAM 1 MG: 1 TABLET ORAL at 13:53

## 2019-08-06 RX ADMIN — TRAZODONE HYDROCHLORIDE 50 MG: 50 TABLET ORAL at 21:09

## 2019-08-06 RX ADMIN — LORAZEPAM 1 MG: 1 TABLET ORAL at 22:23

## 2019-08-06 RX ADMIN — DIVALPROEX SODIUM 500 MG: 500 TABLET, DELAYED RELEASE ORAL at 17:06

## 2019-08-06 NOTE — CONSULTS
Assessment and Plan    Medical clearance for psychiatry admission  Patient is medically cleared to continue psychiatry treatment    History of B12 deficiency anemia  Hemoglobin is stable will continue to monitor      VTE Prophylaxis: Reason for no pharmacologic prophylaxis Low risk  / reason for no mechanical VTE prophylaxis Low risk     Recommendations for Discharge:  · Follow up with Psychiatry  and PCP    Counseling / Coordination of Care Time: 45 minutes  Greater than 50% of total time spent on patient counseling and coordination of care  Collaboration of Care: Were Recommendations Directly Discussed with Primary Treatment Team? - Yes     History of Present Illness:    Guero Mclean is a 38 y o  female who is originally admitted to the psychiatry service due to suicidal ideation  We are consulted for medical clearance  Upon my assessment patient is cooperative maintaining good eye contact she denies any medical related problems or taking any medications    Review of Systems:    Review of Systems   Constitutional: Positive for activity change  Past Medical and Surgical History:     Past Medical History:   Diagnosis Date    Addiction to drug ([facility] )     Alcohol abuse     Alcoholism ([facility] )     Anemia due to vitamin B12 deficiency     last assessed 10/10/17, iron deficiency anemia    Bipolar affective disorder (HCC)     current episode mixed, current episode severity unspecified, last assessed 3/8/17    Depression     Hallucination     Psychosis ([facility] )     Pyelonephritis     Self-injurious behavior     Sleep difficulties     Suicide attempt ([facility] )     Vitamin D deficiency        Past Surgical History:   Procedure Laterality Date    TOTAL ABDOMINAL HYSTERECTOMY  2014    TUBAL LIGATION      WISDOM TOOTH EXTRACTION         Meds/Allergies:    all medications and allergies reviewed    Allergies:    Allergies   Allergen Reactions    Lamotrigine Rash and Other (See Comments)     Mary Shaan syndrome  Venofer [Iron Sucrose] Anaphylaxis     IV Venofer    Sulfa Antibiotics Rash       Social History:     Marital Status:     Substance Use History:   Social History     Substance and Sexual Activity   Alcohol Use Yes    Alcohol/week: 2 0 - 5 0 standard drinks    Types: 2 - 5 Cans of beer per week    Frequency: 2-3 times a week    Drinks per session: 3 or 4    Comment: drinking 2-5 beers at a time 2-3 X per week     Social History     Tobacco Use   Smoking Status Former Smoker    Types: Cigarettes    Last attempt to quit: 2018    Years since quittin 9   Smokeless Tobacco Never Used   Tobacco Comment    1-3 cigs/day-11 total     Social History     Substance and Sexual Activity   Drug Use Not Currently       Family History:    non-contributory    Physical Exam:     Vitals:   Blood Pressure: 97/54 (19)  Pulse: 56 (19)  Temperature: 97 6 °F (36 4 °C) (19)  Temp Source: Temporal (19)  Respirations: 16 (19)  Weight - Scale: 69 6 kg (153 lb 7 oz) (19)    Physical Exam   Constitutional: No distress  HENT:   Head: Normocephalic  Eyes: Pupils are equal, round, and reactive to light  Neck: Normal range of motion  Cardiovascular: Regular rhythm  Pulmonary/Chest: Effort normal    Abdominal: Soft  Musculoskeletal: She exhibits no edema  Neurological: She is alert  Skin: Skin is warm  Psychiatric: She has a normal mood and affect  Additional Data:     Lab Results: I have personally reviewed pertinent reports        Results from last 7 days   Lab Units 19  1005   WBC Thousand/uL 6 15   HEMOGLOBIN g/dL 12 9   HEMATOCRIT % 41 5   PLATELETS Thousands/uL 255   NEUTROS PCT % 59   LYMPHS PCT % 31   MONOS PCT % 6   EOS PCT % 3     Results from last 7 days   Lab Units 19  1005   SODIUM mmol/L 141   POTASSIUM mmol/L 4 4   CHLORIDE mmol/L 108   CO2 mmol/L 28   BUN mg/dL 7   CREATININE mg/dL 0 90   ANION GAP mmol/L 5   CALCIUM mg/dL 8 5   ALBUMIN g/dL 3 5   TOTAL BILIRUBIN mg/dL 0 31   ALK PHOS U/L 39*   ALT U/L 11*   AST U/L 8             No results found for: HGBA1C            Imaging: I have personally reviewed pertinent reports  No orders to display         ** Please Note: This note has been constructed using a voice recognition system   **

## 2019-08-06 NOTE — PROGRESS NOTES
08/06/19 0900   Team Meeting   Meeting Type Daily Rounds   Team Members Present   Team Members Present Physician;Nurse;;; Occupational Therapist;Other (Discipline and Name)   Physician Team Member [address] [place] [address] Team Member [facility] Management Team Member Saulo   Social Work Team Member [employer]   OT Team Member Carli   Other (Discipline and Name) SAMUEL Knox - PharmD   Patient/Family Present   Patient Present No   Patient's Family Present No   New patient, placed under Dr Eugene
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
## 2019-08-06 NOTE — ED NOTES
Report called to [facility] [place] for transfer   Pt left ed in stable condition with all belongings     Osito Oswald RN  08/06/19 8906

## 2019-08-06 NOTE — PROGRESS NOTES
Patient came to nurses station with an irritable edge  Patient wanted to know when she was getting her medication  Explained to patient that medication had not been ordered yet  Patient became angry, went to her room, and slammed her door  This RN went to patients room to investigate and make sure patient was safe  Patient was in bed with labile affect  Attempted to explain to patient that she needs to be seen by the doctor first before medications are ordered  Patient stated "[place]nt [place]  I havent had my medication since 6:30 yesterday morning and its going to take days to get my levels up "  Again, this RN explained the process of seeing the doctor followed by orders for medication  Patient stated "If that happens "  Patient then stated "I haven't been treated very well by [employer] "  Patient made disparaging remarks about her employment with [employer] and stated her employment "makes me want to run a marathon around the world (sarcastically)  "  Patient is labile, tearful, irritable, and has poor eye contact

## 2019-08-06 NOTE — PLAN OF CARE
Problem: DISCHARGE PLANNING  Goal: Discharge to home or other facility with appropriate resources  Description  INTERVENTIONS:  - Identify barriers to discharge w/patient and caregiver  - Arrange for needed discharge resources and transportation as appropriate  - Identify discharge learning needs (meds, wound care, etc )  - Refer to Case Management Department for coordinating discharge planning if the patient needs post-hospital services based on physician/advanced practitioner order or complex needs related to functional status, cognitive ability, or social support system   Outcome: Progressing     Problem: DEPRESSION  Goal: Will be euthymic at discharge  Description  INTERVENTIONS:  - Administer medication as ordered  - Provide emotional support via 1:1 interaction with staff  - Encourage involvement in milieu/groups/activities  - Monitor for social isolation  Outcome: Not Progressing     Problem: SUBSTANCE USE/ABUSE  Goal: By discharge, will develop insight into their chemical dependency and sustain motivation to continue in recovery  Description  INTERVENTIONS:  - Attends all daily group sessions and scheduled AA groups  - Actively practices coping skills through participation in the therapeutic community and adherence to program rules  - Reviews and completes assignments from individual treatment plan  - Assist patient development of understanding of their personal cycle of addiction and relapse triggers  Outcome: Not Progressing  Goal: By discharge, patient will have ongoing treatment plan addressing chemical dependency  Description  INTERVENTIONS:  - Assist patient with resources and/or appointments for ongoing recovery based living  Outcome: Not Progressing     Problem: Risk for Self Injury/Neglect  Goal: Treatment Goal: Remain safe during length of stay, learn and adopt new coping skills, and be free of self-injurious ideation, impulses and acts at the time of discharge  Description  Pt will verbalize thoughts and feelings regarding depression/ SI    Outcome: Not Progressing  Goal: Verbalize thoughts and feelings  Description  Interventions:  - Assess and re-assess patient's lethality and potential for self-injury  - Engage patient in 1:1 interactions, daily, for a minimum of 15 minutes  - Encourage patient to express feelings, fears, frustrations, hopes  - Establish rapport/trust with patient   Outcome: Not Progressing  Goal: Refrain from harming self  Description  Interventions:  - Monitor patient closely, per order  - Develop a trusting relationship  - Supervise medication ingestion, monitor effects and side effects   Outcome: Not Progressing  Goal: Recognize maladaptive responses and adopt new coping mechanisms  Description  Pt will start to realize poor respond to depression/SI and will develop positive coping skills   Outcome: Not Progressing     Problem: PSYCHOSIS  Goal: Will report no hallucinations or delusions  Description  Interventions:  - Administer medication as  ordered  - Every waking shifts and PRN assess for the presence of hallucinations and or delusions  - Assist with reality testing to support increasing orientation  - Assess if patient's hallucinations or delusions are encouraging self-harm or harm to others and intervene as appropriate  Outcome: Not Progressing     Problem: SLEEP DISTURBANCE  Goal: Will exhibit normal sleeping pattern  Description  Interventions:  -  Assess the patients sleep pattern, noting recent changes  - Administer medication as ordered  - Decrease environmental stimuli, including noise, as appropriate during the night  - Encourage the patient to actively participate in unit groups and or exercise during the day to enhance ability to achieve adequate sleep at night  - Assess the patient, in the morning, encouraging a description of sleep experience  Outcome: Not Progressing

## 2019-08-06 NOTE — ED NOTES
Patient is accepted at [facility]  Patient is accepted by Dr Korin Yap per Estevan Salmeron  Transportation is arranged with [employer] is scheduled for 0230       Nurse report is to be called to [phone] prior to patient transfer

## 2019-08-06 NOTE — ED NOTES
201 sent to  Intake for review at [facility] 3P  Patient refusing to go anywhere but [facility] due to her "children not being able to make it further away"      Cherri Mackey LMSW  08/05/19 2012

## 2019-08-06 NOTE — PROGRESS NOTES
Pt admitted from [facility]  With depression, A/V hallucinations and +SI  Pt states that her suicidal ideation is "chronic " Pt states that's he has been suffering from depression and SI since 7years old  Admits to attempting to "strangle" self at 7years old  Admits to past attempts of curtting self and overdosing  Pt states that she has multiple plans for suicide  States that she was thinking about buying a gun last week  Denies that she did this   denies any assess to guns/weapons  Pt also has thoughts about cutting or overdosing  Pt was hearing voices calling her name last Friday at "work " States that yesterday she was seeing "birds in the lloyd" and at times sees the apps on her phone moving  No c/o hallucinations at present  States that one of her brother's committed suicide by drinking alcohol  Pt states that her job is stressor  States that she is new employee to [employer] and doesn't feel very welcomed by peers  Pt states she probaly won't return to job  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

## 2019-08-06 NOTE — ED NOTES
Insurance Authorization:   Phone call placed to [employer] number: [phone]  Spoke to [employer]  3 days approved  Level of care: IPU  Review on 8/8/19  Authorization Tracking  # [id number]  EVS (Eligibility Verification System) called - [phone]    Automated system indicates: not on file

## 2019-08-06 NOTE — TREATMENT PLAN
TREATMENT PLAN REVIEW - [facility] Deshaun Segura 38 y o  1975 female MRN: [id number]    [address] [place] [address] Room / Bed: [facility]- Encounter: [id number]          Admit Date/Time:  8/6/2019  2:23 AM    Treatment Team: Attending Provider: Nabila Granado MD; Consulting Physician: Rox Sacks, MD; Patient Care Technician: Sreedhar Beaulieu;  Patient Care Technician: Annabel Aguiar; Patient Care Technician: Dell Cruz; Registered Nurse: Dirk Montes De Oca RN    Diagnosis: Principal Problem:    Bipolar I disorder, severe, current or most recent episode depressed, with psychotic features [facility]      Patient Strengths/Assets: average or above intelligence, compliant with medication, patient is on a voluntary commitment, patient is willing to work on problems, stable/recent employment    Patient Barriers/Limitations: poor support system, resistance to treatment    Short Term Goals: decrease in depressive symptoms, decrease in anxiety symptoms, decrease in psychotic symptoms, decrease in suicidal thoughts, mood stabilization    Long Term Goals: improvement in depression, improvement in anxiety, resolution of depressive symptoms, stabilization of mood, free of suicidal thoughts, acceptance of need for psychiatric medications, acceptance of need for psychiatric treatment, acceptance of need for psychiatric follow up after discharge, acceptance of psychiatric diagnosis, adequate sleep, appropriate interaction with peers, appropriate interaction with family    Progress Towards Goals: starting psychiatric medications as prescribed    Recommended Treatment: medication management, patient medication education, group therapy, milieu therapy, continued Behavioral Health psychiatric evaluation/assessment process    Treatment Frequency: daily medication monitoring, group and milieu therapy daily, monitoring through interdisciplinary rounds, monitoring through weekly patient care conferences    Expected Discharge Date:  08/13/2019    Discharge Plan: referral for outpatient medication management with a psychiatrist, referral for outpatient psychotherapy    Treatment Plan Created/Updated By: Caryle Guardian, MD

## 2019-08-06 NOTE — PROGRESS NOTES
Patient came to nurses station and requested a clonopin and tylenol  Patient stated she had a headache and left jaw pain 6/10  Patient also stated the clonopin was to "help relax her body muscles "  Patient rated her anxiety 5/10  Patient did not appear anxious while at nurses station  Offered patient Ativan and patient stated "I guess so  I never took that before    I hope it works just as good "  Patient was given tylenol PO PRN, 650 mg (only had mild pain ordered) and was given 1 mg ativan PO PRN

## 2019-08-06 NOTE — PROGRESS NOTES
Pt currently denies all symptoms  Pt states she has poor sleep and appetite  Pt stated to nurse " Some nurse this morning gave me ativan which I never had before when I asked for Klonopin, also am I getting my minipress tonight they started me on that before I came here " Pt was informed both medications were not ordered for her, pt rolled her eyes and gave a sigh stating "okay"  Pt is labile and irritable  Pt out in the milieu  Compliant with medications  Will monitor

## 2019-08-06 NOTE — EMTALA/ACUTE CARE TRANSFER
[facility] EMERGENCY DEPARTMENT  [address] [place] [address]  Dept: [phone]      LDXBLL TRANSFER CONSENT    NAME Shai Barney                                         1975                              MRN [id number]    I have been informed of my rights regarding examination, treatment, and transfer   by Dr Gayle [address]    Benefits:      Risks:        Transfer Request   I acknowledge that my medical condition has been evaluated and explained to me by the emergency department physician or other qualified medical person and/or my attending physician who has recommended and offered to me further medical examination and treatment  I understand the Hospital's obligation with respect to the treatment and stabilization of my emergency medical condition  I nevertheless request to be transferred  I release the Hospital, the doctor, and any other persons caring for me from all responsibility or liability for any injury or ill effects that may result from my transfer and agree to accept all responsibility for the consequences of my choice to transfer, rather than receive stabilizing treatment at the Hospital  I understand that because the transfer is my request, my insurance may not provide reimbursement for the services  The Hospital will assist and direct me and my family in how to make arrangements for transfer, but the hospital is not liable for any fees charged by the transport service  In spite of this understanding, I refuse to consent to further medical examination and treatment which has been offered to me, and request transfer to  Jayashree Perkins Name, [address] : [address]  I authorize the performance of emergency medical procedures and treatments upon me in both transit and upon arrival at the receiving facility  Additionally, I authorize the release of any and all medical records to the receiving facility and request they be transported with me, if possible      I authorize the performance of emergency medical procedures and treatments upon me in both transit and upon arrival at the receiving facility  Additionally, I authorize the release of any and all medical records to the receiving facility and request they be transported with me, if possible  I understand that the safest mode of transportation during a medical emergency is an ambulance and that the Hospital advocates the use of this mode of transport  Risks of traveling to the receiving facility by car, including absence of medical control, life sustaining equipment, such as oxygen, and medical personnel has been explained to me and I fully understand them  (NAZ CORRECT BOX BELOW)  [  ]  I consent to the stated transfer and to be transported by ambulance/helicopter  [  ]  I consent to the stated transfer, but refuse transportation by ambulance and accept full responsibility for my transportation by car  I understand the risks of non-ambulance transfers and I exonerate the Hospital and its staff from any deterioration in my condition that results from this refusal     X___________________________________________    DATE  19  TIME________  Signature of patient or legally responsible individual signing on patient behalf           RELATIONSHIP TO PATIENT_________________________          Provider Certification    NAME Lexa Ford                                         1975                              MRN [id number]    A medical screening exam was performed on the above named patient  Based on the examination:    Condition Necessitating Transfer The primary encounter diagnosis was Bipolar I disorder, moderate, current or most recent episode depressed, with psychotic features ([facility] )  A diagnosis of Suicidal ideations was also pertinent to this visit      Patient Condition:      Reason for Transfer:      Transfer Requirements: Facility [facility]   · Space available and qualified personnel available for treatment as acknowledged by    · Agreed to accept transfer and to provide appropriate medical treatment as acknowledged by       Dr Jez Artis  · Appropriate medical records of the examination and treatment of the patient are provided at the time of transfer   [facility], [address] _______  · Transfer will be performed by qualified personnel from    and appropriate transfer equipment as required, including the use of necessary and appropriate life support measures  Provider Certification: I have examined the patient and explained the following risks and benefits of being transferred/refusing transfer to the patient/family:         Based on these reasonable risks and benefits to the patient and/or the unborn child(mary), and based upon the information available at the time of the patients examination, I certify that the medical benefits reasonably to be expected from the provision of appropriate medical treatments at another medical facility outweigh the increasing risks, if any, to the individuals medical condition, and in the case of labor to the unborn child, from effecting the transfer      X____________________________________________ DATE 08/06/19        TIME_______      ORIGINAL - SEND TO MEDICAL RECORDS   COPY - SEND WITH PATIENT DURING TRANSFER

## 2019-08-07 LAB
T4 FREE SERPL-MCNC: 0.97 NG/DL (ref 0.76–1.46)
TSH SERPL DL<=0.05 MIU/L-ACNC: 1.76 UIU/ML (ref 0.47–4.68)

## 2019-08-07 PROCEDURE — 84439 ASSAY OF FREE THYROXINE: CPT | Performed by: STUDENT IN AN ORGANIZED HEALTH CARE EDUCATION/TRAINING PROGRAM

## 2019-08-07 PROCEDURE — 99232 SBSQ HOSP IP/OBS MODERATE 35: CPT | Performed by: STUDENT IN AN ORGANIZED HEALTH CARE EDUCATION/TRAINING PROGRAM

## 2019-08-07 PROCEDURE — 84443 ASSAY THYROID STIM HORMONE: CPT | Performed by: STUDENT IN AN ORGANIZED HEALTH CARE EDUCATION/TRAINING PROGRAM

## 2019-08-07 RX ORDER — CLONAZEPAM 0.5 MG/1
0.5 TABLET ORAL
Status: DISCONTINUED | OUTPATIENT
Start: 2019-08-07 | End: 2019-08-16 | Stop reason: HOSPADM

## 2019-08-07 RX ADMIN — DIVALPROEX SODIUM 500 MG: 500 TABLET, DELAYED RELEASE ORAL at 09:04

## 2019-08-07 RX ADMIN — LITHIUM CARBONATE 300 MG: 300 TABLET, FILM COATED, EXTENDED RELEASE ORAL at 09:04

## 2019-08-07 RX ADMIN — CLONAZEPAM 0.5 MG: 0.5 TABLET ORAL at 21:11

## 2019-08-07 RX ADMIN — LITHIUM CARBONATE 450 MG: 450 TABLET, EXTENDED RELEASE ORAL at 21:11

## 2019-08-07 RX ADMIN — DIVALPROEX SODIUM 500 MG: 500 TABLET, DELAYED RELEASE ORAL at 17:08

## 2019-08-07 RX ADMIN — TRAZODONE HYDROCHLORIDE 50 MG: 50 TABLET ORAL at 21:17

## 2019-08-07 NOTE — PROGRESS NOTES
Progress Note - Behavioral Health   Bharti Rhoades 38 y o  female MRN: [id number]  Unit/Bed#: [facility] 350-01 Encounter: [id number]    The patient was seen for continuing care and reviewed with treatment team   Patient continues to report depressed mood  Patient reports depression as 9/10 and reports poor sleep with difficulty falling asleep last night  Patient reports suicidal ideation last night  Patient denies auditory hallucinations but reports visual hallucinations of seeing edges of things been blurry  Patient also endorses paranoid ideation that people are out to get her  Patient also reports anxiety 3/10  Patient reports good appetite  Patient is refusing for antipsychotics to be added to her medication regimen  Patient denies side effects  Mental Status Evaluation:    Appearance:  casually dressed   Behavior:  cooperative   Speech:  normal rate and volume   Mood:  depressed, anxious   Affect:  constricted   Thought Process:  coherent   Associations: concrete associations   Thought Content:  some paranoia   Perceptual Disturbances: no auditory hallucinations, visual hallucinations   Risk Potential: Suicidal ideation - None at present  Homicidal ideation - None  Potential for aggression - No   Sensorium:  oriented to person, place and time/date   Memory:  recent and remote memory grossly intact   Consciousness:  alert and awake   Attention: attention span and concentration appear shorter than expected for age   Insight:  limited   Judgment: limited   Gait/Station: normal gait/station   Motor Activity: no abnormal movements       Vitals:    08/07/19 1100   BP: 113/64   Pulse: 60   Resp: 16   Temp:        Assessment/Plan    Principal Problem:    Bipolar I disorder, severe, current or most recent episode depressed, with psychotic features ([facility] [address] )      Recommended Treatment:  Continue present medications  Patient is agreeing to ECT treatment    Patient with chronic suicidal ideation and admitted with plan to shoot herself  Patient reports medications have not worked for many years and she is willing to try ECT treatment at this time  Continue with pharmacotherapy, group therapy, milieu therapy and occupational therapy    The patient will be maintained on the following medications:    Current Facility-Administered Medications:  acetaminophen 650 mg Oral Q6H PRN Luke Gr MD   aluminum-magnesium hydroxide-simethicone 30 mL Oral Q4H PRN Luke Gr MD   benztropine 1 mg Intramuscular Q6H PRN Luke Gr MD   benztropine 1 mg Oral Q6H PRN Luke Gr MD   divalproex sodium 500 mg Oral BID Jaciel Ho MD   haloperidol 5 mg Oral Q6H PRN Luke Gr MD   haloperidol lactate 5 mg Intramuscular Q6H PRN Luke Gr MD   lithium carbonate 300 mg Oral Daily Jaciel Ho MD   lithium carbonate 450 mg Oral HS Jaciel Ho MD   LORazepam 1 mg Intramuscular Q6H PRN Luke Gr MD   LORazepam 1 mg Oral Q6H PRN Luke Gr MD   magnesium hydroxide 30 mL Oral Daily PRN Luke Gr MD   traZODone 50 mg Oral HS PRN Luke Gr MD

## 2019-08-07 NOTE — PROGRESS NOTES
Pt complaining of feeling anxious  HAM scale score 18 indicating moderate anxiety  PRN ativan given  Will monitor

## 2019-08-07 NOTE — PROGRESS NOTES
Pt currently denies all symptoms  Pt is labile and irritable  Pt is seclusive to her room  Pt is concerned that her cats may be dead and no one is taking care of them  Pt wants to speak with her  about the plan for when she leaves here stating " a lot needs to change when I leave " Compliant with medications  Will monitor

## 2019-08-07 NOTE — PROGRESS NOTES
Pt attended stress management group  Hopeless and helpless  Labile, flat, limited eye contact and constricted affect  Group reviewed physical, emotional behavioral symptoms of stress, monitoring stress levels,  Cycle of stress, positive self talk and coping mechanisms  Continue to provide therepuetic group support  08/07/19 1115   Activity/Group Checklist   Group Other (Comment)  (stress management)   Attendance Attended   Attendance Duration (min) 46-60   Interactions Interacted appropriately   Affect/Mood Blunted/flat   Goals Achieved Identified feelings; Discussed coping strategies; Able to listen to others; Able to engage in interactions; Able to reflect/comment on own behavior

## 2019-08-07 NOTE — PROGRESS NOTES
Discussed with patient ECT  Patient refused Abilify added to medications    Patient continues to have SI

## 2019-08-07 NOTE — PLAN OF CARE
Problem: DEPRESSION  Goal: Will be euthymic at discharge  Description  INTERVENTIONS:  - Administer medication as ordered  - Provide emotional support via 1:1 interaction with staff  - Encourage involvement in milieu/groups/activities  - Monitor for social isolation  Outcome: Progressing     Problem: SUBSTANCE USE/ABUSE  Goal: By discharge, will develop insight into their chemical dependency and sustain motivation to continue in recovery  Description  INTERVENTIONS:  - Attends all daily group sessions and scheduled AA groups  - Actively practices coping skills through participation in the therapeutic community and adherence to program rules  - Reviews and completes assignments from individual treatment plan  - Assist patient development of understanding of their personal cycle of addiction and relapse triggers  Outcome: Progressing  Goal: By discharge, patient will have ongoing treatment plan addressing chemical dependency  Description  INTERVENTIONS:  - Assist patient with resources and/or appointments for ongoing recovery based living  Outcome: Progressing     Problem: Risk for Self Injury/Neglect  Goal: Treatment Goal: Remain safe during length of stay, learn and adopt new coping skills, and be free of self-injurious ideation, impulses and acts at the time of discharge  Description  Pt will verbalize thoughts and feelings regarding depression/ SI    Outcome: Progressing  Goal: Verbalize thoughts and feelings  Description  Interventions:  - Assess and re-assess patient's lethality and potential for self-injury  - Engage patient in 1:1 interactions, daily, for a minimum of 15 minutes  - Encourage patient to express feelings, fears, frustrations, hopes  - Establish rapport/trust with patient   Outcome: Progressing  Goal: Refrain from harming self  Description  Interventions:  - Monitor patient closely, per order  - Develop a trusting relationship  - Supervise medication ingestion, monitor effects and side effects Outcome: Progressing  Goal: Recognize maladaptive responses and adopt new coping mechanisms  Description  Pt will start to realize poor respond to depression/SI and will develop positive coping skills   Outcome: Progressing     Problem: PSYCHOSIS  Goal: Will report no hallucinations or delusions  Description  Interventions:  - Administer medication as  ordered  - Every waking shifts and PRN assess for the presence of hallucinations and or delusions  - Assist with reality testing to support increasing orientation  - Assess if patient's hallucinations or delusions are encouraging self-harm or harm to others and intervene as appropriate  Outcome: Progressing     Problem: SLEEP DISTURBANCE  Goal: Will exhibit normal sleeping pattern  Description  Interventions:  -  Assess the patients sleep pattern, noting recent changes  - Administer medication as ordered  - Decrease environmental stimuli, including noise, as appropriate during the night  - Encourage the patient to actively participate in unit groups and or exercise during the day to enhance ability to achieve adequate sleep at night  - Assess the patient, in the morning, encouraging a description of sleep experience  Outcome: Progressing     Problem: DISCHARGE PLANNING  Goal: Discharge to home or other facility with appropriate resources  Description  INTERVENTIONS:  - Identify barriers to discharge w/patient and caregiver  - Arrange for needed discharge resources and transportation as appropriate  - Identify discharge learning needs (meds, wound care, etc )  - Refer to Case Management Department for coordinating discharge planning if the patient needs post-hospital services based on physician/advanced practitioner order or complex needs related to functional status, cognitive ability, or social support system   Outcome: Progressing     Problem: Ineffective Coping  Goal: Participates in unit activities  Description  Interventions:  - Provide therapeutic environment - Provide required programming   - Redirect inappropriate behaviors   Outcome: Progressing

## 2019-08-07 NOTE — PROGRESS NOTES
08/07/19 0902   Team Meeting   Meeting Type Daily Rounds   Team Members Present   Team Members Present Physician;Nurse;   Physician Team Member NANETTE POLLOCK   [facility]   Care Management Team Member Saulo   Social Work Team Member Casey   Patient/Family Present   Patient Present No   Patient's Family Present No   Dr NANETTE POLLOCK discussed ECT with patient and she is considering it  Will review medications

## 2019-08-07 NOTE — PROGRESS NOTES
Clinical Pharmacy Note: Medication Education Group     Intervention:  Open Forum    Length of Group: 20 min     Methods/resources used: verbal discussion     Topics Discussed: Medication adherence, side effect management, nonpharmacologic strategies, medication effectiveness and indications      Time spent in group: Entire time      Patient Specific concerns: Coco Trotter presented to group today dressed in street clothing and appeared somewhat disheveled and alert and oriented to person, place and time  Coco Trotter seemed melancholic  Patient's affect was mainly pleasant and quiet and she actively participated in group today  Patient was respectful of others throughout and interacted appropriately with peers  Coco Bargerer did have specific concerns about alopecia caused by depakote  Coco Trotter asked about the available treatment options for alopecia  Writer discussed various supplements and treatments but overall stated switching medications is the most effective way to manage alopecia      Patient understood counseling: yes     Electronically signed by: Josseline Marques, Pharmacist

## 2019-08-07 NOTE — PROGRESS NOTES
ZOEY's signed for Krupa Roger (father), Westley Holley and Kade Ferguson (children)       Aury Dhillon- [phone]  Elfego Pnea- [phone]  [facility]- [phone]

## 2019-08-07 NOTE — PROGRESS NOTES
Patient reports SI for 30 years "They never go away  When my kids were born I might have gotten a few days relief "  Patient reports sleeping 6 hours last night and states "more then I get at home "  Patient also states she does not feel she can return to work  ECT was discussed between Dr NANETTE POLLOCK and patient, and patient was agreeable  Patient refused adding Abilify to medication

## 2019-08-08 ENCOUNTER — ANESTHESIA EVENT (INPATIENT)
Dept: PREOP/PACU | Facility: HOSPITAL | Age: 44
DRG: 885 | End: 2019-08-08
Payer: COMMERCIAL

## 2019-08-08 PROCEDURE — 99232 SBSQ HOSP IP/OBS MODERATE 35: CPT | Performed by: STUDENT IN AN ORGANIZED HEALTH CARE EDUCATION/TRAINING PROGRAM

## 2019-08-08 RX ADMIN — LITHIUM CARBONATE 300 MG: 300 TABLET, FILM COATED, EXTENDED RELEASE ORAL at 08:24

## 2019-08-08 RX ADMIN — DIVALPROEX SODIUM 500 MG: 500 TABLET, DELAYED RELEASE ORAL at 17:11

## 2019-08-08 RX ADMIN — DIVALPROEX SODIUM 500 MG: 500 TABLET, DELAYED RELEASE ORAL at 08:25

## 2019-08-08 RX ADMIN — LITHIUM CARBONATE 450 MG: 450 TABLET, EXTENDED RELEASE ORAL at 21:27

## 2019-08-08 RX ADMIN — TRAZODONE HYDROCHLORIDE 50 MG: 50 TABLET ORAL at 21:27

## 2019-08-08 RX ADMIN — CLONAZEPAM 0.5 MG: 0.5 TABLET ORAL at 21:27

## 2019-08-08 RX ADMIN — ACETAMINOPHEN 650 MG: 325 TABLET ORAL at 06:52

## 2019-08-08 NOTE — PROGRESS NOTES
08/08/19 0920   Team Meeting   Meeting Type Daily Rounds   Team Members Present   Team Members Present Physician;Nurse;; Other (Discipline and Name)   Physician Team Member Melita Aguila [place] Team Member ARNOL HOLDEN [facility] Management Team Member Casey Vernon   Other (Discipline and Name) CHENTE Carrillo   Patient/Family Present   Patient Present No   Patient's Family Present No     Pt will start ECT tomorrow  Pt will receive 6 ECT treatments

## 2019-08-08 NOTE — PROGRESS NOTES
RAYO GROUP NOTE     08/08/19 1000   Activity/Group Checklist   Group Admission/Discharge   Attendance Attended   Attendance Duration (min) 16-30   Interactions Interacted appropriately   Affect/Mood Appropriate   Goals Achieved Identified feelings; Identified triggers; Able to self-disclose

## 2019-08-08 NOTE — PROGRESS NOTES
Progress Note - Behavioral Health   Guero Mclean 38 y o  female MRN: [id number]  Unit/Bed#: [facility] 350-01 Encounter: [id number]    The patient was seen for continuing care and reviewed with treatment team   Patient continues to endorse depressed mood  Patient has an irritable edge, reports feeling tired and reports poor sleep with midnight awakenings last night  Patient denies suicidal thoughts at this time and stated that the last time she had suicidal thoughts was yesterday  Patient reports good appetite  Patient denies auditory or visual hallucinations  Patient denies side effects with    Mental Status Evaluation:     Appearance:  dressed appropriately   Behavior:  cooperative   Speech:  normal rate and volume   Mood:  depressed   Affect:  constricted   Thought Process:  coherent   Associations: concrete associations   Thought Content:  no overt delusions   Perceptual Disturbances: no auditory hallucinations, no visual hallucinations   Risk Potential: Suicidal ideation - None at present  Homicidal ideation - None  Potential for aggression - No   Sensorium:  oriented to person, place and time/date   Memory:  recent and remote memory grossly intact   Consciousness:  alert and awake   Attention: attention span and concentration are age appropriate   Insight:  fair   Judgment: limited   Gait/Station: normal gait/station   Motor Activity: no abnormal movements       Vitals:    08/08/19 0720   BP: 100/51   Pulse: 73   Resp: 16   Temp: 98 °F (36 7 °C)       Assessment/Plan    Principal Problem:    Bipolar I disorder, severe, current or most recent episode depressed, with psychotic features ([facility] [address] )      Recommended Treatment: Continue present medications  Continue with pharmacotherapy, group therapy, milieu therapy and occupational therapy    The patient will be maintained on the following medications:    Current Facility-Administered Medications:  acetaminophen 650 mg Oral Q6H PRN Rand Lauren MD   aluminum-magnesium hydroxide-simethicone 30 mL Oral Q4H PRN Magdalene Morrison MD   benztropine 1 mg Intramuscular Q6H PRN Magdalene Morrison MD   benztropine 1 mg Oral Q6H PRN Magdalene Morrison MD   clonazePAM 0 5 mg Oral HS Bhaskar Mendoza MD   divalproex sodium 500 mg Oral BID Jack Mckeon MD   haloperidol 5 mg Oral Q6H PRN Magdalene Morrison, MD   haloperidol lactate 5 mg Intramuscular Q6H PRN Magdalene Morrison MD   lithium carbonate 300 mg Oral Daily Jack Mckeon MD   lithium carbonate 450 mg Oral HS Jack Mckeon MD   LORazepam 1 mg Intramuscular Q6H PRN Magdalene Morrison MD   LORazepam 1 mg Oral Q6H PRN Magdalene Morrison MD   magnesium hydroxide 30 mL Oral Daily PRN Magdalene Morrison MD   traZODone 50 mg Oral HS PRN Magdalene Morrison MD

## 2019-08-08 NOTE — PROGRESS NOTES
Pt is pleasant and social on unit  She continues to deny symptoms  Offers no complaints  Compliant with care  Will continue to monitor

## 2019-08-08 NOTE — PROGRESS NOTES
Pt visible on unit  Pleasant on approach  Pt has an irritable edge  States has anxiety r/t issues r/t work and worried about taking ARACELI but states life more important than material things  States situation at work bad and does not think she will have a job to r/t to  Denies SI, HI and hallucinations  Making call to human resources trying to get ARACELI started, requesting to speak with CM  Compliant with care  Will monitor

## 2019-08-08 NOTE — PROGRESS NOTES
RAYO GROUP NOTE     08/08/19 0930   Activity/Group Checklist   Group Community meeting   Attendance Attended   Attendance Duration (min) 16-30   Interactions Interacted appropriately   Affect/Mood Appropriate   Goals Achieved Able to listen to others; Able to engage in interactions   Objectives/Key Points:  Living intentionally  Goal Setting  Thought for the Day  Self Check In

## 2019-08-08 NOTE — ANESTHESIA PREPROCEDURE EVALUATION
Review of Systems/Medical History  Patient summary reviewed  Chart reviewed      Cardiovascular  Hyperlipidemia,    Pulmonary  Smoker ex-smoker  ,        GI/Hepatic  Negative GI/hepatic ROS          Negative  ROS        Endo/Other  Negative endo/other ROS      GYN    Hysterectomy,        Hematology  Anemia ,     Musculoskeletal       Neurology  Negative neurology ROS      Psychology   Depression ,     Comment: H/O ETOH abuse         Physical Exam    Airway    Mallampati score: II  TM Distance: >3 FB  Neck ROM: full     Dental   Comment: Upper bridge,     Cardiovascular  Cardiovascular exam normal    Pulmonary  Pulmonary exam normal     Other Findings        Anesthesia Plan  ASA Score- 2     Anesthesia Type- general with ASA Monitors  Additional Monitors:   Airway Plan:         Plan Factors-    Induction- intravenous  Postoperative Plan-     Informed Consent- Anesthetic plan and risks discussed with patient  I personally reviewed this patient with the CRNA  Discussed and agreed on the Anesthesia Plan with the CRNA  Chacorta Shepard

## 2019-08-09 ENCOUNTER — APPOINTMENT (INPATIENT)
Dept: PREOP/PACU | Facility: HOSPITAL | Age: 44
DRG: 885 | End: 2019-08-09
Payer: COMMERCIAL

## 2019-08-09 ENCOUNTER — ANESTHESIA (INPATIENT)
Dept: PREOP/PACU | Facility: HOSPITAL | Age: 44
DRG: 885 | End: 2019-08-09
Payer: COMMERCIAL

## 2019-08-09 LAB
ATRIAL RATE: 70 BPM
P AXIS: 70 DEGREES
PR INTERVAL: 126 MS
QRS AXIS: 66 DEGREES
QRSD INTERVAL: 62 MS
QT INTERVAL: 520 MS
QTC INTERVAL: 561 MS
T WAVE AXIS: 61 DEGREES
VENTRICULAR RATE: 70 BPM

## 2019-08-09 PROCEDURE — 90870 ELECTROCONVULSIVE THERAPY: CPT

## 2019-08-09 PROCEDURE — 93010 ELECTROCARDIOGRAM REPORT: CPT | Performed by: INTERNAL MEDICINE

## 2019-08-09 PROCEDURE — 99232 SBSQ HOSP IP/OBS MODERATE 35: CPT | Performed by: STUDENT IN AN ORGANIZED HEALTH CARE EDUCATION/TRAINING PROGRAM

## 2019-08-09 PROCEDURE — 93005 ELECTROCARDIOGRAM TRACING: CPT

## 2019-08-09 PROCEDURE — 90870 ELECTROCONVULSIVE THERAPY: CPT | Performed by: PSYCHIATRY & NEUROLOGY

## 2019-08-09 PROCEDURE — GZB0ZZZ ELECTROCONVULSIVE THERAPY, UNILATERAL-SINGLE SEIZURE: ICD-10-PCS | Performed by: PSYCHIATRY & NEUROLOGY

## 2019-08-09 RX ORDER — ETOMIDATE 2 MG/ML
INJECTION INTRAVENOUS AS NEEDED
Status: DISCONTINUED | OUTPATIENT
Start: 2019-08-09 | End: 2019-08-09 | Stop reason: SURG

## 2019-08-09 RX ORDER — SODIUM CHLORIDE 9 MG/ML
INJECTION, SOLUTION INTRAVENOUS CONTINUOUS PRN
Status: DISCONTINUED | OUTPATIENT
Start: 2019-08-09 | End: 2019-08-09 | Stop reason: SURG

## 2019-08-09 RX ORDER — ESMOLOL HYDROCHLORIDE 10 MG/ML
INJECTION INTRAVENOUS AS NEEDED
Status: DISCONTINUED | OUTPATIENT
Start: 2019-08-09 | End: 2019-08-09 | Stop reason: SURG

## 2019-08-09 RX ORDER — SODIUM CHLORIDE 9 MG/ML
125 INJECTION, SOLUTION INTRAVENOUS CONTINUOUS
Status: DISCONTINUED | OUTPATIENT
Start: 2019-08-09 | End: 2019-08-16

## 2019-08-09 RX ORDER — SUCCINYLCHOLINE/SOD CL,ISO/PF 100 MG/5ML
SYRINGE (ML) INTRAVENOUS AS NEEDED
Status: DISCONTINUED | OUTPATIENT
Start: 2019-08-09 | End: 2019-08-09 | Stop reason: SURG

## 2019-08-09 RX ORDER — KETOROLAC TROMETHAMINE 30 MG/ML
30 INJECTION, SOLUTION INTRAMUSCULAR; INTRAVENOUS ONCE
Status: COMPLETED | OUTPATIENT
Start: 2019-08-09 | End: 2019-08-09

## 2019-08-09 RX ORDER — ONDANSETRON 4 MG/1
4 TABLET, ORALLY DISINTEGRATING ORAL ONCE
Status: COMPLETED | OUTPATIENT
Start: 2019-08-09 | End: 2019-08-09

## 2019-08-09 RX ADMIN — LITHIUM CARBONATE 450 MG: 450 TABLET, EXTENDED RELEASE ORAL at 21:42

## 2019-08-09 RX ADMIN — Medication 80 MG: at 07:15

## 2019-08-09 RX ADMIN — ETOMIDATE 12 MG: 2 INJECTION, SOLUTION INTRAVENOUS at 07:14

## 2019-08-09 RX ADMIN — DIVALPROEX SODIUM 500 MG: 500 TABLET, DELAYED RELEASE ORAL at 17:41

## 2019-08-09 RX ADMIN — ESMOLOL HYDROCHLORIDE 50 MG: 10 INJECTION, SOLUTION INTRAVENOUS at 07:18

## 2019-08-09 RX ADMIN — LITHIUM CARBONATE 300 MG: 300 TABLET, FILM COATED, EXTENDED RELEASE ORAL at 09:37

## 2019-08-09 RX ADMIN — CLONAZEPAM 0.5 MG: 0.5 TABLET ORAL at 21:42

## 2019-08-09 RX ADMIN — SODIUM CHLORIDE: 0.9 INJECTION, SOLUTION INTRAVENOUS at 07:13

## 2019-08-09 RX ADMIN — DIVALPROEX SODIUM 500 MG: 500 TABLET, DELAYED RELEASE ORAL at 09:37

## 2019-08-09 RX ADMIN — SODIUM CHLORIDE 125 ML/HR: 9 INJECTION, SOLUTION INTRAVENOUS at 06:12

## 2019-08-09 RX ADMIN — ACETAMINOPHEN 650 MG: 325 TABLET ORAL at 11:12

## 2019-08-09 RX ADMIN — KETOROLAC TROMETHAMINE 30 MG: 30 INJECTION, SOLUTION INTRAMUSCULAR; INTRAVENOUS at 07:47

## 2019-08-09 RX ADMIN — ONDANSETRON 4 MG: 4 TABLET, ORALLY DISINTEGRATING ORAL at 08:19

## 2019-08-09 RX ADMIN — ACETAMINOPHEN 650 MG: 325 TABLET ORAL at 17:42

## 2019-08-09 NOTE — PROGRESS NOTES
Progress Note - Behavioral Health   Markie Orr 38 y o  female MRN: [id number]  Unit/Bed#: [facility] 350-01 Encounter: [id number]    The patient was seen for continuing care and reviewed with treatment team  Patient had first ECT treatment today  Patient reports headache and tiredness after ECT treatment and was given PRN medications  Patient reports depressed mood and denies suicidal or homicidal ideation  Patient denies side effects  Mental Status Evaluation:    Appearance:  dressed appropriately   Behavior:  cooperative   Speech:  normal rate and volume   Mood:  depressed   Affect:  constricted   Thought Process:  coherent   Associations: concrete associations   Thought Content:  no overt delusions   Perceptual Disturbances: no auditory hallucinations, no visual hallucinations   Risk Potential: Suicidal ideation - None  Homicidal ideation - None  Potential for aggression - No   Sensorium:  oriented to person, place and time/date   Memory:  recent and remote memory grossly intact   Consciousness:  alert and awake   Attention: attention span and concentration appear shorter than expected for age   Insight:  limited   Judgment: limited   Gait/Station: normal gait/station   Motor Activity: no abnormal movements       Vitals:    08/09/19 0940   BP: 92/60   Pulse: 60   Resp: 16   Temp: 98 °F (36 7 °C)   SpO2: 99%       Assessment/Plan    Principal Problem:    Bipolar I disorder, severe, current or most recent episode depressed, with psychotic features ([facility] [address] )      Recommended Treatment: Continue present medications  Continue with pharmacotherapy, group therapy, milieu therapy and occupational therapy    The patient will be maintained on the following medications:    Current Facility-Administered Medications:  acetaminophen 650 mg Oral Q6H PRN Barbi Varner MD    aluminum-magnesium hydroxide-simethicone 30 mL Oral Q4H PRN Barbi Varner MD    benztropine 1 mg Intramuscular Q6H PRN Barbi Varner MD    benztropine 1 mg Oral Q6H PRN Maribel Arango MD    clonazePAM 0 5 mg Oral HS Bhaskar Mendoza MD    divalproex sodium 500 mg Oral BID Viviana Mcduffie MD    haloperidol 5 mg Oral Q6H PRN Maribel Arango MD    haloperidol lactate 5 mg Intramuscular Q6H PRN Maribel Arango MD    lithium carbonate 300 mg Oral Daily Viviana Mcduffie MD    lithium carbonate 450 mg Oral HS Viviana Mcduffie MD    LORazepam 1 mg Intramuscular Q6H PRN Maribel Arango MD    LORazepam 1 mg Oral Q6H PRN Maribel Arango MD    magnesium hydroxide 30 mL Oral Daily PRN Maribel Arango MD    sodium chloride 125 mL/hr Intravenous Continuous Reynaldo España MD Last Rate: Stopped (08/09/19 0757)   traZODone 50 mg Oral HS PRN Maribel Arango MD

## 2019-08-09 NOTE — OP NOTE
Procedure Note - ECT  Marti Aase 38 y o  female MRN: [id number]    Time out was taken with staff to confirm correct patient and correct procedure to be performed  We again discussed the effects of the ECT on depression, and decision was made to start with RUL, as treatment that should led to less potential side effects, such as memory loss  Pt agreed with a choice  Her vital were good with slightly lower BP, normal HR and no evidence of abnormality in her electrical heart activity, that was monitored before the procedure  Pt vital signs were reviewed with the pt, she stated that she always had low BP, and it is normal for her  Session Number: 001    Diagnosis: MDD    ECT Type: Inpatient, Acute    Anesthesia: Brevital    Electrode Placement: right unilateral    Energy level: 50      Seizure Duration     EE sec, high amplitude symmetrical EEG response with marked postictal suppression  EM sec    Results:Clinical seizure was satisfactory, Patient tolerated ECT well, Complications: some headache after procedure, toradol 30 mg was given IV  No postictal combativeness  Nausea was reported after wakening up  Oral Zofran was ordered

## 2019-08-09 NOTE — PROGRESS NOTES
Pt was NPO past midnight  Offers no complaints  Escorted to ECT via wheelchair  Staff on this unit were unable to obtain lab work when attempted  Staff has asked PACU to obtain lab work when inserting IV for the procedure

## 2019-08-09 NOTE — PROGRESS NOTES
08/08/19 1415   Activity/Group Checklist   Group Other (Comment)  (Art Therapy Process Group/Visual Introduction, Discussion)   Attendance Attended   Attendance Duration (min) Greater than 60   Interactions Interacted appropriately   Affect/Mood Appropriate   Goals Achieved Able to listen to others; Able to engage in interactions; Discussed coping strategies; Able to recieve feedback; Able to give feedback to another  (Able to engage art materials; full participation)

## 2019-08-09 NOTE — CASE MANAGEMENT
Pt had 1st ECT tx today  SW tried to meet with pt  However, pt was resting in bed  C/O headache & nausea  Will try to see pt when feeling better

## 2019-08-09 NOTE — PROGRESS NOTES
Pt observed in bedroom resting safely and is s/p first ECT treatment  Pt states she feels she is "less depressed and just wiped out today from ECT " No thoughts or plan for SI  Currently contracts for safety  Does report headache and some body aches; tylenol not yet due

## 2019-08-09 NOTE — PROGRESS NOTES
Pt denies all symptoms  Pt is seclusive to room laying in bed stating she is tired after ECT  Pt had a slight headache but is getting better with rest  Pt was given a one time dose Zofran for nausea after ECT  Pt is calm and cooperative  Compliant with medications  Will monitor

## 2019-08-09 NOTE — PLAN OF CARE
Problem: DEPRESSION  Goal: Will be euthymic at discharge  Description  INTERVENTIONS:  - Administer medication as ordered  - Provide emotional support via 1:1 interaction with staff  - Encourage involvement in milieu/groups/activities  - Monitor for social isolation  Outcome: Progressing     Problem: SUBSTANCE USE/ABUSE  Goal: By discharge, will develop insight into their chemical dependency and sustain motivation to continue in recovery  Description  INTERVENTIONS:  - Attends all daily group sessions and scheduled AA groups  - Actively practices coping skills through participation in the therapeutic community and adherence to program rules  - Reviews and completes assignments from individual treatment plan  - Assist patient development of understanding of their personal cycle of addiction and relapse triggers  Outcome: Progressing  Goal: By discharge, patient will have ongoing treatment plan addressing chemical dependency  Description  INTERVENTIONS:  - Assist patient with resources and/or appointments for ongoing recovery based living  Outcome: Progressing     Problem: Risk for Self Injury/Neglect  Goal: Treatment Goal: Remain safe during length of stay, learn and adopt new coping skills, and be free of self-injurious ideation, impulses and acts at the time of discharge  Description  Pt will verbalize thoughts and feelings regarding depression/ SI    Outcome: Progressing  Goal: Verbalize thoughts and feelings  Description  Interventions:  - Assess and re-assess patient's lethality and potential for self-injury  - Engage patient in 1:1 interactions, daily, for a minimum of 15 minutes  - Encourage patient to express feelings, fears, frustrations, hopes  - Establish rapport/trust with patient   Outcome: Progressing  Goal: Refrain from harming self  Description  Interventions:  - Monitor patient closely, per order  - Develop a trusting relationship  - Supervise medication ingestion, monitor effects and side effects Outcome: Progressing  Goal: Recognize maladaptive responses and adopt new coping mechanisms  Description  Pt will start to realize poor respond to depression/SI and will develop positive coping skills   Outcome: Progressing     Problem: PSYCHOSIS  Goal: Will report no hallucinations or delusions  Description  Interventions:  - Administer medication as  ordered  - Every waking shifts and PRN assess for the presence of hallucinations and or delusions  - Assist with reality testing to support increasing orientation  - Assess if patient's hallucinations or delusions are encouraging self-harm or harm to others and intervene as appropriate  Outcome: Progressing     Problem: SLEEP DISTURBANCE  Goal: Will exhibit normal sleeping pattern  Description  Interventions:  -  Assess the patients sleep pattern, noting recent changes  - Administer medication as ordered  - Decrease environmental stimuli, including noise, as appropriate during the night  - Encourage the patient to actively participate in unit groups and or exercise during the day to enhance ability to achieve adequate sleep at night  - Assess the patient, in the morning, encouraging a description of sleep experience  Outcome: Progressing     Problem: DISCHARGE PLANNING  Goal: Discharge to home or other facility with appropriate resources  Description  INTERVENTIONS:  - Identify barriers to discharge w/patient and caregiver  - Arrange for needed discharge resources and transportation as appropriate  - Identify discharge learning needs (meds, wound care, etc )  - Refer to Case Management Department for coordinating discharge planning if the patient needs post-hospital services based on physician/advanced practitioner order or complex needs related to functional status, cognitive ability, or social support system   Outcome: Progressing

## 2019-08-09 NOTE — PROGRESS NOTES
08/09/19 0935   Team Meeting   Meeting Type Daily Rounds   Team Members Present   Team Members Present Physician;Nurse;;; Occupational Therapist;Other (Discipline and Name)   Physician Team Member Omar Covington Team Member [facility] Team Member Fredis Vernon   Social Work Team Member Fredis Reno   OT Team Member Vj   Other (Discipline and Name) CHENTE Carrillo   Patient/Family Present   Patient Present No   Patient's Family Present No   Continue with ECT and medication management

## 2019-08-09 NOTE — ANESTHESIA POSTPROCEDURE EVALUATION
Post-Op Assessment Note    CV Status:  Stable  Pain Score: 0    Pain management: adequate     Mental Status:  Alert and awake   Hydration Status:  Euvolemic   PONV Controlled:  Controlled   Airway Patency:  Patent   Post Op Vitals Reviewed: Yes      Staff: CRNA           BP   136/66   Temp      Pulse  66   Resp   20   SpO2   98

## 2019-08-10 LAB
LITHIUM SERPL-SCNC: 1.1 MMOL/L (ref 0.6–1.2)
VALPROATE SERPL-MCNC: 62.9 UG/ML (ref 50–120)

## 2019-08-10 PROCEDURE — 80164 ASSAY DIPROPYLACETIC ACD TOT: CPT | Performed by: STUDENT IN AN ORGANIZED HEALTH CARE EDUCATION/TRAINING PROGRAM

## 2019-08-10 PROCEDURE — 99232 SBSQ HOSP IP/OBS MODERATE 35: CPT | Performed by: PHYSICIAN ASSISTANT

## 2019-08-10 PROCEDURE — 80178 ASSAY OF LITHIUM: CPT | Performed by: STUDENT IN AN ORGANIZED HEALTH CARE EDUCATION/TRAINING PROGRAM

## 2019-08-10 RX ADMIN — ACETAMINOPHEN 650 MG: 325 TABLET ORAL at 05:06

## 2019-08-10 RX ADMIN — TRAZODONE HYDROCHLORIDE 50 MG: 50 TABLET ORAL at 21:48

## 2019-08-10 RX ADMIN — LORAZEPAM 1 MG: 1 TABLET ORAL at 09:04

## 2019-08-10 RX ADMIN — LITHIUM CARBONATE 300 MG: 300 TABLET, FILM COATED, EXTENDED RELEASE ORAL at 08:49

## 2019-08-10 RX ADMIN — DIVALPROEX SODIUM 500 MG: 500 TABLET, DELAYED RELEASE ORAL at 08:49

## 2019-08-10 RX ADMIN — DIVALPROEX SODIUM 500 MG: 500 TABLET, DELAYED RELEASE ORAL at 17:32

## 2019-08-10 RX ADMIN — LITHIUM CARBONATE 450 MG: 450 TABLET, EXTENDED RELEASE ORAL at 21:04

## 2019-08-10 RX ADMIN — CLONAZEPAM 0.5 MG: 0.5 TABLET ORAL at 21:04

## 2019-08-10 NOTE — PROGRESS NOTES
Pt is about the milieu, denies SI  Pt is quiet, calm, has had no complaints or issues today  She mainly keeps to herself  Pt complained of anxiety earlier, muscle aches, has had no further complaints

## 2019-08-10 NOTE — PLAN OF CARE
Problem: Electroconvulsive therapy (ECT)  Goal: Verbalizes/displays adequate comfort level or baseline comfort level  Description  Interventions:  - Encourage patient to monitor pain and request assistance  - Assess pain using appropriate pain scale  - Administer analgesics based on type and severity of pain and evaluate response  - Implement non-pharmacological measures as appropriate and evaluate response  - Consider cultural and social influences on pain and pain management  - Notify physician/advanced practitioner if interventions unsuccessful or patient reports new pain  Outcome: Progressing     Problem: Electroconvulsive therapy (ECT)  Goal: Achieves stable or improved neurological status  Description  INTERVENTIONS  - Monitor and report changes in neurological status  - Initiate measures to prevent increased intracranial pressure  - Maintain blood pressure and fluid volume within ordered parameters to optimize cerebral perfusion  - Monitor temperature, glucose, and sodium or any other associated labs   Initiate appropriate interventions as ordered  - Monitor for seizure activity   - Administer anti-seizure medications as ordered  Outcome: Progressing     Problem: Electroconvulsive therapy (ECT)  Goal: Achieves maximal functionality and self care  Description  INTERVENTIONS  - Monitor swallowing and airway patency with patient fatigue and changes in neurological status  - Encourage and assist patient to increase activity and self care with guidance from rehab services  - Encourage visually impaired, hearing impaired and aphasic patients to use assistive/communication devices  Outcome: Progressing

## 2019-08-10 NOTE — PROGRESS NOTES
Progress Note - Behavioral Health   Nicole Sky 38 y o  female MRN: [id number]  Unit/Bed#: [facility] 350-01 Encounter: [id number]    Assessment/Plan   Principal Problem:    Bipolar I disorder, severe, current or most recent episode depressed, with psychotic features ([facility] [address] )      Subjective:  Patient cooperative but guarded during conversation  Is on 2 mood stabilizers which are both in therapeutic range  Denied side effects of Depakote and lithium  States ECT has caused her to be stiff and she has a headache  Tylenol effective  Will not add muscle relaxant due to low blood pressure  Did take Ativan as needed today  Reports from staff is she somatic and is obsessive at times  Reports depressive symptoms are slowly decreasing and denies suicidal thoughts today  Did appear somewhat anxious  Does not endorse criteria for lb  Denies psychotic symptoms  Medication compliant  Tolerating medications well without serious side effects      Current Medications:  Current Facility-Administered Medications   Medication Dose Route Frequency    acetaminophen (TYLENOL) tablet 650 mg  650 mg Oral Q6H PRN    aluminum-magnesium hydroxide-simethicone (MYLANTA) 200-200-20 mg/5 mL oral suspension 30 mL  30 mL Oral Q4H PRN    benztropine (COGENTIN) injection 1 mg  1 mg Intramuscular Q6H PRN    benztropine (COGENTIN) tablet 1 mg  1 mg Oral Q6H PRN    clonazePAM (KlonoPIN) tablet 0 5 mg  0 5 mg Oral HS    divalproex sodium (DEPAKOTE) EC tablet 500 mg  500 mg Oral BID    haloperidol (HALDOL) tablet 5 mg  5 mg Oral Q6H PRN    haloperidol lactate (HALDOL) injection 5 mg  5 mg Intramuscular Q6H PRN    lithium carbonate (LITHOBID) CR tablet 300 mg  300 mg Oral Daily    lithium carbonate (LITHOBID) CR tablet 450 mg  450 mg Oral HS    LORazepam (ATIVAN) 2 mg/mL injection 1 mg  1 mg Intramuscular Q6H PRN    LORazepam (ATIVAN) tablet 1 mg  1 mg Oral Q6H PRN    magnesium hydroxide (MILK OF MAGNESIA) 400 mg/5 mL oral suspension 30 mL 30 mL Oral Daily PRN    sodium chloride 0 9 % infusion  125 mL/hr Intravenous Continuous    traZODone (DESYREL) tablet 50 mg  50 mg Oral HS PRN       Behavioral Health Medications: all current active meds have been reviewed and continue current psychiatric medications  Vitals:  Vitals:    08/10/19 1100   BP: 109/51   Pulse: 81   Resp:    Temp:    SpO2:        Laboratory results:    I have personally reviewed all pertinent laboratory/tests results  Most Recent Labs:   Lab Results   Component Value Date    WBC 5 40 08/06/2019    RBC 3 81 (L) 08/06/2019    HGB 12 1 08/06/2019    HCT 36 6 08/06/2019     08/06/2019    RDW 12 9 08/06/2019    NEUTROABS 3 20 08/06/2019    SODIUM 139 08/06/2019    K 4 2 08/06/2019     08/06/2019    CO2 27 08/06/2019    BUN 11 08/06/2019    CREATININE 0 74 08/06/2019    GLUC 76 08/06/2019    GLUF 76 08/06/2019    CALCIUM 8 7 08/06/2019    AST 15 08/06/2019    ALT 9 08/06/2019    ALKPHOS 33 (L) 08/06/2019    TP 6 0 08/06/2019    ALB 3 5 08/06/2019    TBILI 0 20 08/06/2019    CHOLESTEROL 175 08/03/2019    HDL 86 (H) 08/03/2019    TRIG 83 08/03/2019    LDLCALC 72 08/03/2019    VALPROICTOT 62 9 08/10/2019    LITHIUM 1 1 08/10/2019    XKT0KMSCLRUX 1 760 08/07/2019    FREET4 0 97 08/07/2019       Psychiatric Review of Systems:  Behavior over the last 24 hours:  improved  Sleep: normal  Appetite: normal  Medication side effects: No  ROS: Generalized muscular issues    Mental Status Evaluation:  Appearance:  casually dressed   Behavior:  guarded and Cooperative   Speech:  soft   Mood:  Depressed and anxious   Affect:  constricted   Language Appropriate   Thought Process:  logical and linear   Thought Content:  obsessions   Perceptual Disturbances: None   Risk Potential: Denied SI/HI   Potential for aggression: No   Sensorium:  person, place and time/date   Cognition:  grossly intact   Consciousness:  alert and awake    Recent and Remote Memory fair   Attention: attention span appeared shorter than expected for age   Insight:  limited   Judgment: Improving   Gait/Station: normal gait/station and normal balance   Motor Activity: no abnormal movements     Progress Toward Goals:  Progressing    Recommended Treatment: Continue with group therapy, milieu therapy and occupational therapy  1   Continue current medications  2  Continue ECT    Risks, benefits and possible side effects of Medications:   Risks, benefits, and possible side effects of medications explained to patient and patient verbalizes understanding        Hermelindo Augustine PA-C

## 2019-08-10 NOTE — PROGRESS NOTES
Pt denies SI and states her depression and anxiety are low  She states she is feeling good  She is hopeful the ECT treatments will work to help decrease her depression  Pt is visible in milieu, although she keeps to herself  Will monitor

## 2019-08-10 NOTE — PLAN OF CARE
Problem: DEPRESSION  Goal: Will be euthymic at discharge  Description  INTERVENTIONS:  - Administer medication as ordered  - Provide emotional support via 1:1 interaction with staff  - Encourage involvement in milieu/groups/activities  - Monitor for social isolation  Outcome: Progressing     Problem: SUBSTANCE USE/ABUSE  Goal: By discharge, will develop insight into their chemical dependency and sustain motivation to continue in recovery  Description  INTERVENTIONS:  - Attends all daily group sessions and scheduled AA groups  - Actively practices coping skills through participation in the therapeutic community and adherence to program rules  - Reviews and completes assignments from individual treatment plan  - Assist patient development of understanding of their personal cycle of addiction and relapse triggers  Outcome: Progressing  Goal: By discharge, patient will have ongoing treatment plan addressing chemical dependency  Description  INTERVENTIONS:  - Assist patient with resources and/or appointments for ongoing recovery based living  Outcome: Progressing     Problem: Risk for Self Injury/Neglect  Goal: Treatment Goal: Remain safe during length of stay, learn and adopt new coping skills, and be free of self-injurious ideation, impulses and acts at the time of discharge  Description  Pt will verbalize thoughts and feelings regarding depression/ SI    Outcome: Progressing  Goal: Verbalize thoughts and feelings  Description  Interventions:  - Assess and re-assess patient's lethality and potential for self-injury  - Engage patient in 1:1 interactions, daily, for a minimum of 15 minutes  - Encourage patient to express feelings, fears, frustrations, hopes  - Establish rapport/trust with patient   Outcome: Progressing  Goal: Refrain from harming self  Description  Interventions:  - Monitor patient closely, per order  - Develop a trusting relationship  - Supervise medication ingestion, monitor effects and side effects Outcome: Progressing  Goal: Recognize maladaptive responses and adopt new coping mechanisms  Description  Pt will start to realize poor respond to depression/SI and will develop positive coping skills   Outcome: Progressing     Problem: PSYCHOSIS  Goal: Will report no hallucinations or delusions  Description  Interventions:  - Administer medication as  ordered  - Every waking shifts and PRN assess for the presence of hallucinations and or delusions  - Assist with reality testing to support increasing orientation  - Assess if patient's hallucinations or delusions are encouraging self-harm or harm to others and intervene as appropriate  Outcome: Progressing     Problem: SLEEP DISTURBANCE  Goal: Will exhibit normal sleeping pattern  Description  Interventions:  -  Assess the patients sleep pattern, noting recent changes  - Administer medication as ordered  - Decrease environmental stimuli, including noise, as appropriate during the night  - Encourage the patient to actively participate in unit groups and or exercise during the day to enhance ability to achieve adequate sleep at night  - Assess the patient, in the morning, encouraging a description of sleep experience  Outcome: Progressing

## 2019-08-10 NOTE — PROGRESS NOTES
Pt is calm, cooperative with medications  She is complaining of some discomfort/tightness in her shoulders, slight headache from ECT  Pt complaining of anxiety, she is visible this morning, quiet, watching TV without issue

## 2019-08-11 ENCOUNTER — ANESTHESIA EVENT (INPATIENT)
Dept: PREOP/PACU | Facility: HOSPITAL | Age: 44
DRG: 885 | End: 2019-08-11
Payer: COMMERCIAL

## 2019-08-11 PROCEDURE — 99232 SBSQ HOSP IP/OBS MODERATE 35: CPT | Performed by: PHYSICIAN ASSISTANT

## 2019-08-11 RX ADMIN — TRAZODONE HYDROCHLORIDE 50 MG: 50 TABLET ORAL at 21:19

## 2019-08-11 RX ADMIN — LITHIUM CARBONATE 450 MG: 450 TABLET, EXTENDED RELEASE ORAL at 21:19

## 2019-08-11 RX ADMIN — CLONAZEPAM 0.5 MG: 0.5 TABLET ORAL at 21:19

## 2019-08-11 RX ADMIN — LITHIUM CARBONATE 300 MG: 300 TABLET, FILM COATED, EXTENDED RELEASE ORAL at 08:29

## 2019-08-11 RX ADMIN — DIVALPROEX SODIUM 500 MG: 500 TABLET, DELAYED RELEASE ORAL at 18:36

## 2019-08-11 RX ADMIN — ACETAMINOPHEN 650 MG: 325 TABLET ORAL at 08:38

## 2019-08-11 RX ADMIN — DIVALPROEX SODIUM 500 MG: 500 TABLET, DELAYED RELEASE ORAL at 08:29

## 2019-08-11 NOTE — PROGRESS NOTES
Pt is calm, cooperative, compliant with medications, has no further issues at this time  Pt states she feels moderately depressed and anxious, denies all other S/S

## 2019-08-11 NOTE — PLAN OF CARE
Problem: DEPRESSION  Goal: Will be euthymic at discharge  Description  INTERVENTIONS:  - Administer medication as ordered  - Provide emotional support via 1:1 interaction with staff  - Encourage involvement in milieu/groups/activities  - Monitor for social isolation  Outcome: Progressing     Problem: SUBSTANCE USE/ABUSE  Goal: By discharge, will develop insight into their chemical dependency and sustain motivation to continue in recovery  Description  INTERVENTIONS:  - Attends all daily group sessions and scheduled AA groups  - Actively practices coping skills through participation in the therapeutic community and adherence to program rules  - Reviews and completes assignments from individual treatment plan  - Assist patient development of understanding of their personal cycle of addiction and relapse triggers  Outcome: Progressing  Goal: By discharge, patient will have ongoing treatment plan addressing chemical dependency  Description  INTERVENTIONS:  - Assist patient with resources and/or appointments for ongoing recovery based living  Outcome: Progressing     Problem: Risk for Self Injury/Neglect  Goal: Treatment Goal: Remain safe during length of stay, learn and adopt new coping skills, and be free of self-injurious ideation, impulses and acts at the time of discharge  Description  Pt will verbalize thoughts and feelings regarding depression/ SI    Outcome: Progressing  Goal: Verbalize thoughts and feelings  Description  Interventions:  - Assess and re-assess patient's lethality and potential for self-injury  - Engage patient in 1:1 interactions, daily, for a minimum of 15 minutes  - Encourage patient to express feelings, fears, frustrations, hopes  - Establish rapport/trust with patient   Outcome: Progressing  Goal: Refrain from harming self  Description  Interventions:  - Monitor patient closely, per order  - Develop a trusting relationship  - Supervise medication ingestion, monitor effects and side effects Outcome: Progressing  Goal: Recognize maladaptive responses and adopt new coping mechanisms  Description  Pt will start to realize poor respond to depression/SI and will develop positive coping skills   Outcome: Progressing     Problem: PSYCHOSIS  Goal: Will report no hallucinations or delusions  Description  Interventions:  - Administer medication as  ordered  - Every waking shifts and PRN assess for the presence of hallucinations and or delusions  - Assist with reality testing to support increasing orientation  - Assess if patient's hallucinations or delusions are encouraging self-harm or harm to others and intervene as appropriate  Outcome: Progressing     Problem: SLEEP DISTURBANCE  Goal: Will exhibit normal sleeping pattern  Description  Interventions:  -  Assess the patients sleep pattern, noting recent changes  - Administer medication as ordered  - Decrease environmental stimuli, including noise, as appropriate during the night  - Encourage the patient to actively participate in unit groups and or exercise during the day to enhance ability to achieve adequate sleep at night  - Assess the patient, in the morning, encouraging a description of sleep experience  Outcome: Progressing     Problem: Electroconvulsive therapy (ECT)  Goal: Verbalizes/displays adequate comfort level or baseline comfort level  Description  Interventions:  - Encourage patient to monitor pain and request assistance  - Assess pain using appropriate pain scale  - Administer analgesics based on type and severity of pain and evaluate response  - Implement non-pharmacological measures as appropriate and evaluate response  - Consider cultural and social influences on pain and pain management  - Notify physician/advanced practitioner if interventions unsuccessful or patient reports new pain  Outcome: Progressing  Goal: Achieves stable or improved neurological status  Description  INTERVENTIONS  - Monitor and report changes in neurological status  - Initiate measures to prevent increased intracranial pressure  - Maintain blood pressure and fluid volume within ordered parameters to optimize cerebral perfusion  - Monitor temperature, glucose, and sodium or any other associated labs   Initiate appropriate interventions as ordered  - Monitor for seizure activity   - Administer anti-seizure medications as ordered  Outcome: Progressing  Goal: Achieves maximal functionality and self care  Description  INTERVENTIONS  - Monitor swallowing and airway patency with patient fatigue and changes in neurological status  - Encourage and assist patient to increase activity and self care with guidance from rehab services  - Encourage visually impaired, hearing impaired and aphasic patients to use assistive/communication devices  Outcome: Progressing

## 2019-08-11 NOTE — ANESTHESIA PREPROCEDURE EVALUATION
Review of Systems/Medical History  Patient summary reviewed  Chart reviewed  No history of anesthetic complications     Cardiovascular  Negative cardio ROS Hyperlipidemia,    Pulmonary  Smoker cigar smoker  , Tobacco cessation counseling given Cumulative Pack Years: 20, COPD mild- PRN medication , No sleep apnea ,        GI/Hepatic  Negative GI/hepatic ROS   Liver disease , alcohol related,        Negative  ROS        Endo/Other     GYN    Hysterectomy,        Hematology  Anemia iron deficiency anemia,     Musculoskeletal       Neurology  Negative neurology ROS      Psychology   Depression , being treated for depression,     Comment: ETOH abuse         Physical Exam    Airway    Mallampati score: II  TM Distance: >3 FB  Neck ROM: full     Dental       Cardiovascular  Comment: Negative ROS, Cardiovascular exam normal    Pulmonary  Pulmonary exam normal     Other Findings        Anesthesia Plan  ASA Score- 2     Anesthesia Type- general with ASA Monitors  Additional Monitors:   Airway Plan:     Comment: Chart reviewed and pt states no new changes in health  No prior ECT issues  No questions voiced  Pt re consents to ECT  Plan Factors-Patient not instructed to abstain from smoking on day of procedure  Patient did not smoke on day of surgery  Induction- intravenous  Postoperative Plan-     Informed Consent- Anesthetic plan and risks discussed with patient  I personally reviewed this patient with the CRNA  Discussed and agreed on the Anesthesia Plan with the CRNA  Opal Rucker

## 2019-08-11 NOTE — PROGRESS NOTES
Pt is calm, cooperative, pleasant  She is complaint with medications  Pt remains depressed, states she is anxious about having ECT tomorrow  Pt was reassured, offered PRN medications but declined  She denies all other S/S  She is visible in the milieu, mostly quiet and to herself

## 2019-08-11 NOTE — PROGRESS NOTES
Progress Note - Behavioral Health   Bindu Surrey 38 y o  female MRN: [id number]  Unit/Bed#: [facility] 350-01 Encounter: [id number]    Assessment/Plan   Principal Problem:    Bipolar I disorder, severe, current or most recent episode depressed, with psychotic features ([address] )      Subjective:  Patient reports feeling better since starting ECT  Is optimistic to continue treatment and to be discharged this week  Mood does not appear labile  Is on 2 mood stabilizers that are in therapeutic range  Denies psychosis  No signs of lb  Denied anxiety that is excessive currently  It does fluctuate  Reports feeling more hopeful and future oriented  Denied suicidal thoughts today  Less somatic today  Medication compliant  Tolerating medications without serious side effects      Current Medications:  Current Facility-Administered Medications   Medication Dose Route Frequency    acetaminophen (TYLENOL) tablet 650 mg  650 mg Oral Q6H PRN    aluminum-magnesium hydroxide-simethicone (MYLANTA) 200-200-20 mg/5 mL oral suspension 30 mL  30 mL Oral Q4H PRN    benztropine (COGENTIN) injection 1 mg  1 mg Intramuscular Q6H PRN    benztropine (COGENTIN) tablet 1 mg  1 mg Oral Q6H PRN    clonazePAM (KlonoPIN) tablet 0 5 mg  0 5 mg Oral HS    divalproex sodium (DEPAKOTE) EC tablet 500 mg  500 mg Oral BID    haloperidol (HALDOL) tablet 5 mg  5 mg Oral Q6H PRN    haloperidol lactate (HALDOL) injection 5 mg  5 mg Intramuscular Q6H PRN    lithium carbonate (LITHOBID) CR tablet 300 mg  300 mg Oral Daily    lithium carbonate (LITHOBID) CR tablet 450 mg  450 mg Oral HS    LORazepam (ATIVAN) 2 mg/mL injection 1 mg  1 mg Intramuscular Q6H PRN    LORazepam (ATIVAN) tablet 1 mg  1 mg Oral Q6H PRN    magnesium hydroxide (MILK OF MAGNESIA) 400 mg/5 mL oral suspension 30 mL  30 mL Oral Daily PRN    sodium chloride 0 9 % infusion  125 mL/hr Intravenous Continuous    traZODone (DESYREL) tablet 50 mg  50 mg Oral HS PRN       Behavioral Health Medications: all current active meds have been reviewed and continue current psychiatric medications  Vitals:  Vitals:    08/11/19 1048   BP: 103/52   Pulse: 79   Resp:    Temp:    SpO2:        Laboratory results:    I have personally reviewed all pertinent laboratory/tests results  Most Recent Labs:   Lab Results   Component Value Date    WBC 5 40 08/06/2019    RBC 3 81 (L) 08/06/2019    HGB 12 1 08/06/2019    HCT 36 6 08/06/2019     08/06/2019    RDW 12 9 08/06/2019    NEUTROABS 3 20 08/06/2019    SODIUM 139 08/06/2019    K 4 2 08/06/2019     08/06/2019    CO2 27 08/06/2019    BUN 11 08/06/2019    CREATININE 0 74 08/06/2019    GLUC 76 08/06/2019    GLUF 76 08/06/2019    CALCIUM 8 7 08/06/2019    AST 15 08/06/2019    ALT 9 08/06/2019    ALKPHOS 33 (L) 08/06/2019    TP 6 0 08/06/2019    ALB 3 5 08/06/2019    TBILI 0 20 08/06/2019    CHOLESTEROL 175 08/03/2019    HDL 86 (H) 08/03/2019    TRIG 83 08/03/2019    LDLCALC 72 08/03/2019    VALPROICTOT 62 9 08/10/2019    LITHIUM 1 1 08/10/2019    [id number] 1 760 08/07/2019    FREET4 0 97 08/07/2019       Psychiatric Review of Systems:  Behavior over the last 24 hours:  unchanged  Sleep: normal  Appetite: normal  Medication side effects: No  ROS: no complaints    Mental Status Evaluation:  Appearance:  casually dressed   Behavior:  guarded but cooperative   Speech:  normal pitch and normal volume   Mood:  Less anxious and depressed   Affect:  mood-congruent   Language appropriate   Thought Process:  goal directed and logical   Thought Content:  obsessions   Perceptual Disturbances: None   Risk Potential: Denied suicidal and homicidal ideation    Potential for aggression no   Sensorium:  person, place and time/date   Cognition:  grossly intact   Consciousness:  alert and awake    Recent and Remote Memory fair   Attention: attention span and concentration were age appropriate   Insight:  Improving   Judgment: Improving   Gait/Station: normal gait/station and normal balance   Motor Activity: no abnormal movements     Progress Toward Goals: progressing slowly    Recommended Treatment: Continue with group therapy, milieu therapy and occupational therapy  1   Continue current medications  2  Continue ECT    Risks, benefits and possible side effects of Medications:   Risks, benefits, and possible side effects of medications explained to patient and patient verbalizes understanding        Will Saleh PA-C

## 2019-08-12 ENCOUNTER — ANESTHESIA (INPATIENT)
Dept: PREOP/PACU | Facility: HOSPITAL | Age: 44
DRG: 885 | End: 2019-08-12
Payer: COMMERCIAL

## 2019-08-12 ENCOUNTER — APPOINTMENT (INPATIENT)
Dept: PREOP/PACU | Facility: HOSPITAL | Age: 44
DRG: 885 | End: 2019-08-12
Payer: COMMERCIAL

## 2019-08-12 PROCEDURE — GZB0ZZZ ELECTROCONVULSIVE THERAPY, UNILATERAL-SINGLE SEIZURE: ICD-10-PCS | Performed by: PSYCHIATRY & NEUROLOGY

## 2019-08-12 PROCEDURE — 99232 SBSQ HOSP IP/OBS MODERATE 35: CPT | Performed by: STUDENT IN AN ORGANIZED HEALTH CARE EDUCATION/TRAINING PROGRAM

## 2019-08-12 PROCEDURE — 93005 ELECTROCARDIOGRAM TRACING: CPT

## 2019-08-12 PROCEDURE — 90870 ELECTROCONVULSIVE THERAPY: CPT

## 2019-08-12 PROCEDURE — 90870 ELECTROCONVULSIVE THERAPY: CPT | Performed by: PSYCHIATRY & NEUROLOGY

## 2019-08-12 RX ORDER — ACETAMINOPHEN 325 MG/1
975 TABLET ORAL EVERY 6 HOURS PRN
Status: DISCONTINUED | OUTPATIENT
Start: 2019-08-12 | End: 2019-08-16 | Stop reason: HOSPADM

## 2019-08-12 RX ORDER — SODIUM CHLORIDE 9 MG/ML
125 INJECTION, SOLUTION INTRAVENOUS CONTINUOUS
Status: DISCONTINUED | OUTPATIENT
Start: 2019-08-12 | End: 2019-08-16

## 2019-08-12 RX ORDER — SUCCINYLCHOLINE/SOD CL,ISO/PF 100 MG/5ML
SYRINGE (ML) INTRAVENOUS AS NEEDED
Status: DISCONTINUED | OUTPATIENT
Start: 2019-08-12 | End: 2019-08-12 | Stop reason: SURG

## 2019-08-12 RX ORDER — ONDANSETRON 2 MG/ML
INJECTION INTRAMUSCULAR; INTRAVENOUS AS NEEDED
Status: DISCONTINUED | OUTPATIENT
Start: 2019-08-12 | End: 2019-08-12 | Stop reason: SURG

## 2019-08-12 RX ORDER — ACETAMINOPHEN 325 MG/1
650 TABLET ORAL EVERY 4 HOURS PRN
Status: DISCONTINUED | OUTPATIENT
Start: 2019-08-12 | End: 2019-08-16 | Stop reason: HOSPADM

## 2019-08-12 RX ORDER — ETOMIDATE 2 MG/ML
INJECTION INTRAVENOUS AS NEEDED
Status: DISCONTINUED | OUTPATIENT
Start: 2019-08-12 | End: 2019-08-12 | Stop reason: SURG

## 2019-08-12 RX ORDER — KETOROLAC TROMETHAMINE 30 MG/ML
INJECTION, SOLUTION INTRAMUSCULAR; INTRAVENOUS AS NEEDED
Status: DISCONTINUED | OUTPATIENT
Start: 2019-08-12 | End: 2019-08-12 | Stop reason: SURG

## 2019-08-12 RX ADMIN — ACETAMINOPHEN 650 MG: 325 TABLET ORAL at 14:07

## 2019-08-12 RX ADMIN — LITHIUM CARBONATE 450 MG: 450 TABLET, EXTENDED RELEASE ORAL at 21:35

## 2019-08-12 RX ADMIN — SODIUM CHLORIDE 125 ML/HR: 9 INJECTION, SOLUTION INTRAVENOUS at 06:08

## 2019-08-12 RX ADMIN — Medication 80 MG: at 07:18

## 2019-08-12 RX ADMIN — DIVALPROEX SODIUM 500 MG: 500 TABLET, DELAYED RELEASE ORAL at 08:29

## 2019-08-12 RX ADMIN — DIVALPROEX SODIUM 500 MG: 500 TABLET, DELAYED RELEASE ORAL at 18:51

## 2019-08-12 RX ADMIN — KETOROLAC TROMETHAMINE 30 MG: 30 INJECTION, SOLUTION INTRAMUSCULAR; INTRAVENOUS at 07:26

## 2019-08-12 RX ADMIN — ACETAMINOPHEN 650 MG: 325 TABLET ORAL at 08:29

## 2019-08-12 RX ADMIN — LITHIUM CARBONATE 300 MG: 300 TABLET, FILM COATED, EXTENDED RELEASE ORAL at 08:29

## 2019-08-12 RX ADMIN — ONDANSETRON HYDROCHLORIDE 4 MG: 2 INJECTION, SOLUTION INTRAMUSCULAR; INTRAVENOUS at 07:15

## 2019-08-12 RX ADMIN — ETOMIDATE 12 MG: 2 INJECTION, SOLUTION INTRAVENOUS at 07:16

## 2019-08-12 RX ADMIN — TRAZODONE HYDROCHLORIDE 50 MG: 50 TABLET ORAL at 21:35

## 2019-08-12 RX ADMIN — CLONAZEPAM 0.5 MG: 0.5 TABLET ORAL at 21:35

## 2019-08-12 NOTE — PROGRESS NOTES
08/12/19 1240   Service   Service [facility]   Provider Name Cecil Has   Multi-disciplinary Rounds   Diagnosis  Reviewed   Pending Pathology and Pertinent Tests Comment section  (ECG reviewed)   Level of care Current level of care is appropriate     Provider reviewed ECG

## 2019-08-12 NOTE — PROGRESS NOTES
Pt denies all symptoms  She went to bed early and states she is looking forward to ECT in the morning  Pt is pleasant

## 2019-08-12 NOTE — PROGRESS NOTES
Progress Note - Behavioral Health   Joy Garcia 38 y o  female MRN: [id number]  Unit/Bed#: [facility] 350-01 Encounter: [id number]    The patient was seen for continuing care and reviewed with treatment team   Patient has ECT today; patient has been lying in bed, with complains of headache and some tiredness  Patient reports depressed mood, with constricted affect  Patient reports good sleep and good appetite  Patient denies suicidal ideation at this time  Patient denies side effects  Mental Status Evaluation:    Appearance:  dressed appropriately   Behavior:  cooperative   Speech:  scant   Mood:  depressed   Affect:  constricted   Thought Process:  coherent, goal directed   Associations: intact associations   Thought Content:  no overt delusions   Perceptual Disturbances: no auditory hallucinations, no visual hallucinations   Risk Potential: Suicidal ideation - None at present  Homicidal ideation - None  Potential for aggression - No   Sensorium:  oriented to person, place and time/date   Memory:  recent and remote memory grossly intact   Consciousness:  alert and awake   Attention: attention span and concentration appear shorter than expected for age   Insight:  fair   Judgment: fair   Gait/Station: in bed   Motor Activity: no abnormal movements       Vitals:    08/12/19 1103   BP: 104/50   Pulse: 97   Resp: 16   Temp:    SpO2:        Assessment/Plan    Principal Problem:    Bipolar I disorder, severe, current or most recent episode depressed, with psychotic features ([facility] [address] )      Recommended Treatment:  Continue with ECT courses  Also continue current psychotropics  Continue with pharmacotherapy, group therapy, milieu therapy and occupational therapy    The patient will be maintained on the following medications:    Current Facility-Administered Medications:  acetaminophen 650 mg Oral Q6H PRN Tona Bustillo MD    acetaminophen 650 mg Oral Q4H PRN Stef Vazquez MD    acetaminophen 975 mg Oral Q6H PRN Bhaskar HORTON MD Reji    aluminum-magnesium hydroxide-simethicone 30 mL Oral Q4H PRN Ervin Rouse MD    benztropine 1 mg Intramuscular Q6H PRN Ervin Rouse MD    benztropine 1 mg Oral Q6H PRN Ervin Rosue MD    clonazePAM 0 5 mg Oral HS Bhaskar Mendoza MD    divalproex sodium 500 mg Oral BID Bang Garcia MD    haloperidol 5 mg Oral Q6H PRN Ervin Rouse MD    haloperidol lactate 5 mg Intramuscular Q6H PRN Ervin Rouse MD    lithium carbonate 300 mg Oral Daily Bang Garcia MD    lithium carbonate 450 mg Oral HS Bang Garcia MD    LORazepam 1 mg Intramuscular Q6H PRN Ervin Rouse MD    LORazepam 1 mg Oral Q6H PRN Ervin Rouse MD    magnesium hydroxide 30 mL Oral Daily PRN Ervin Rouse MD    sodium chloride 125 mL/hr Intravenous Continuous Beth Ta MD Last Rate: Stopped (08/12/19 0747)   sodium chloride 125 mL/hr Intravenous Continuous Poli Anguiano DO Last Rate: Stopped (08/12/19 0830)   traZODone 50 mg Oral HS PRJJ Rouse MD

## 2019-08-12 NOTE — OP NOTE
Procedure Note - ECT  Reynaldo Oneill 38 y o  female MRN: [id number]    Time out was taken with staff to confirm correct patient and correct procedure to be performed  Pt was tearful when discussed about her mood, her anxiety and her future  Brief supportive therapy was provided  Depression level is still high  Session Number: 002    Diagnosis: MDD    ECT Type: Inpatient, Acute    Anesthesia: Brevital    Electrode Placement: right unilateral    Energy level: 50      Seizure Duration     EE sec, high amplitude symmetrical EEG response with marked postictal suppression was noted again  EM sec - visual     Results:Clinical seizure was satisfactory, Patient tolerated ECT well  Zofran and Toradol were provided to treat nausea and headaches

## 2019-08-12 NOTE — PROGRESS NOTES
Pt observed with baseline isolation although does communicate effectively with staff and peers when appropriate  States "I've been feeling a little less sore from ECT than my previous treatment " Pt is considering work options and wants to discuss them with

## 2019-08-12 NOTE — PROGRESS NOTES
08/12/19 1029   Team Meeting   Meeting Type Daily Rounds   Team Members Present   Team Members Present Physician;Nurse;   Physician Team Member Valorie   Nursing Team Member Apollo   Social Work Team Member Casey   Patient/Family Present   Patient Present No   Patient's Family Present No     Pt will continue to ECT at this time  Will do new EKG and check for QT prolongation and will follow up with [facility] if needed  Possible d/c next Wednesday

## 2019-08-12 NOTE — PLAN OF CARE
Problem: Ineffective Coping  Goal: Participates in unit activities  Description  Interventions:  - Provide therapeutic environment   - Provide required programming   - Redirect inappropriate behaviors   Outcome: Not Progressing    No group attendance as of early PM today

## 2019-08-12 NOTE — PROGRESS NOTES
Notified to review patient's EKG given prolonged QT/QTC on admission  Patient is receiving ECT treatments      Initial QT QTC :  520/561  Repeat EKG performed 08/12/2019:  QT//401

## 2019-08-12 NOTE — ANESTHESIA POSTPROCEDURE EVALUATION
Post-Op Assessment Note    CV Status:  Stable  Pain Score: 0    Pain management: adequate     Mental Status:  Alert and awake   Hydration Status:  Euvolemic   PONV Controlled:  Controlled   Airway Patency:  Patent   Post Op Vitals Reviewed: Yes      Staff: Anesthesiologist, CRNA           /56 (08/12/19 0731)    Temp      Pulse 66 (08/12/19 0731)   Resp 18 (08/12/19 0731)    SpO2 94 % (08/12/19 0731)

## 2019-08-12 NOTE — PROGRESS NOTES
Pt returned from ECT at 08:00  Pt c/o headache and given Tylenol for same  Pt denies any other pain  Pt resting in bed at this time

## 2019-08-12 NOTE — PROGRESS NOTES
Clinical Pharmacy Note: [place]     Collin Rush is a 38 y o  female who presents with  depression, anxiety and suicidal ideation  Assessment/Plan:    Lithium indication: bipolar disorder maintenance  Beryle Dolores is currently taking 750 mg lithium carbonate tablet taken as 300 mg AM and 450 mg PM   Lithium concentration is 1 1 mmol/L drawn at steady state  Continue current-dose/current dosing      The pharmacist has checked for drug interactions, lithium levels, kidney function, thyroid function  YES    Pharmacy Recommendations:   Continue current medication regimen  Monitoring:    Monitor for renal and thyroid dysfunction, skin reactions (acne), weight gain, and diabetes insipidus  Certain medications increase lithium levels and should be avoided such as diuretics, NSAIDS (excluding sulindac), and ACE-I/ARBs  Medications such as theophylline and caffeine may decrease lithium levels  Patient should maintain consistent adequate hydration and consistent caffeine and sodium intake  Lithium:  0   Lab Value Date/Time    LITHIUM 1 1 08/10/2019 0514       Renal:  0   Lab Value Date/Time    BUN 11 08/06/2019 0547    CREATININE 0 74 08/06/2019 0547       Thyroid:  0   Lab Value Date/Time    [id number] 1 760 08/07/2019 0605    FREET4 0 97 08/07/2019 0605       Weight:  Wt Readings from Last 5 Encounters:   08/10/19 69 9 kg (154 lb 3 2 oz)   08/05/19 70 6 kg (155 lb 9 6 oz)   06/10/19 68 7 kg (151 lb 6 4 oz)   02/08/19 68 kg (150 lb)   10/24/18 67 1 kg (148 lb)       Lithium Levels    Therapeutic lithium levels are 0 6-1 2 mmol/L, but target range may be 0 8-1 2 mmol/L for acute lb  Levels above 1 5 mmol/L indicate toxicity, although toxicity may be associated with levels within therapeutic ranges based on symptoms         Mild GI discomfort and slight tremor are typical when initiating lithium, but if these symptoms do not resolve or any other signs of toxicity occur, assess lithium levels immediately  Toxicity    Signs of toxicity include: confusion, difficulty concentrating, vomiting, diarrhea, poor coordination, tremor, abnormal heart rhythm, seizures and coma  Pharmacy will continue to follow patient with team, thank you      Electronically signed by: Nellie Patel, Pharmacist

## 2019-08-12 NOTE — PROGRESS NOTES
Pt is still in bed  Still c/o headache, but unable to have more Tylenol at this time  Pt offered an ice pack for head, refused same  Denies SI  Denies AH  Pt is worried about what what it will be like when she goes home  Pt requesting to speak to her CW  Alexia Bello made aware of same

## 2019-08-12 NOTE — ANESTHESIA POSTPROCEDURE EVALUATION
Post-Op Assessment Note    CV Status:  Stable    Pain management: adequate     Mental Status:  Awake   Hydration Status:  Euvolemic   PONV Controlled:  None   Post Op Vitals Reviewed: Yes      Staff: Anesthesiologist

## 2019-08-13 ENCOUNTER — ANESTHESIA (OUTPATIENT)
Dept: ANESTHESIOLOGY | Facility: HOSPITAL | Age: 44
End: 2019-08-13

## 2019-08-13 ENCOUNTER — ANESTHESIA EVENT (INPATIENT)
Dept: PREOP/PACU | Facility: HOSPITAL | Age: 44
DRG: 885 | End: 2019-08-13
Payer: COMMERCIAL

## 2019-08-13 ENCOUNTER — ANESTHESIA EVENT (OUTPATIENT)
Dept: ANESTHESIOLOGY | Facility: HOSPITAL | Age: 44
End: 2019-08-13

## 2019-08-13 LAB
ATRIAL RATE: 79 BPM
P AXIS: -10 DEGREES
PR INTERVAL: 128 MS
QRS AXIS: -2 DEGREES
QRSD INTERVAL: 80 MS
QT INTERVAL: 350 MS
QTC INTERVAL: 401 MS
T WAVE AXIS: 170 DEGREES
VENTRICULAR RATE: 79 BPM

## 2019-08-13 PROCEDURE — 93010 ELECTROCARDIOGRAM REPORT: CPT | Performed by: INTERNAL MEDICINE

## 2019-08-13 PROCEDURE — 99232 SBSQ HOSP IP/OBS MODERATE 35: CPT | Performed by: STUDENT IN AN ORGANIZED HEALTH CARE EDUCATION/TRAINING PROGRAM

## 2019-08-13 RX ADMIN — TRAZODONE HYDROCHLORIDE 50 MG: 50 TABLET ORAL at 21:25

## 2019-08-13 RX ADMIN — DIVALPROEX SODIUM 500 MG: 500 TABLET, DELAYED RELEASE ORAL at 17:05

## 2019-08-13 RX ADMIN — CLONAZEPAM 0.5 MG: 0.5 TABLET ORAL at 21:25

## 2019-08-13 RX ADMIN — LITHIUM CARBONATE 300 MG: 300 TABLET, FILM COATED, EXTENDED RELEASE ORAL at 08:14

## 2019-08-13 RX ADMIN — LITHIUM CARBONATE 450 MG: 450 TABLET, EXTENDED RELEASE ORAL at 21:25

## 2019-08-13 RX ADMIN — DIVALPROEX SODIUM 500 MG: 500 TABLET, DELAYED RELEASE ORAL at 08:14

## 2019-08-13 NOTE — PROGRESS NOTES
Progress Note - Behavioral Health   Guillaume Solano 38 y o  female MRN: [id number]  Unit/Bed#: [facility] 350-01 Encounter: [id number]    The patient was seen for continuing care and reviewed with treatment team   Patient reports feeling better today but continues to endorse depressed mood  Patient stated that she does not know if ECT is working at this time but she is willing to continue with ECT treatment as she waits for improvement  Patient also reports fair sleep and better appetite  Patient denies suicidal or homicidal ideation at this time  Patient denies SI intent or plan at this time  Patient denies symptoms of lb or psychosis  Patient denies side effects  Mental Status Evaluation:    Appearance:  casually dressed   Behavior:  cooperative   Speech:  normal rate and volume   Mood:  depressed   Affect:  constricted   Thought Process:  coherent   Associations: concrete associations   Thought Content:  no overt delusions   Perceptual Disturbances: no auditory hallucinations, no visual hallucinations   Risk Potential: Suicidal ideation - None  Homicidal ideation - None  Potential for aggression - No   Sensorium:  oriented to person, place and time/date   Memory:  recent and remote memory grossly intact   Consciousness:  alert and awake   Attention: attention span and concentration appear shorter than expected for age   Insight:  fair   Judgment: fair   Gait/Station: normal gait/station   Motor Activity: no abnormal movements       Vitals:    08/13/19 1111   BP: 105/59   Pulse: 74   Resp: 16   Temp:    SpO2:        Assessment/Plan    Principal Problem:    Bipolar I disorder, severe, current or most recent episode depressed, with psychotic features ([facility] [address] )      Recommended Treatment:  Continue present medications and ECT treatment  Continue with pharmacotherapy, group therapy, milieu therapy and occupational therapy    The patient will be maintained on the following medications:    Current Facility-Administered Medications:  acetaminophen 650 mg Oral Q6H PRN Jennifer Anguiano MD    acetaminophen 650 mg Oral Q4H PRN Candace France MD    acetaminophen 975 mg Oral Q6H PRN Candace France MD    aluminum-magnesium hydroxide-simethicone 30 mL Oral Q4H PRN Jennifer Anguiano MD    benztropine 1 mg Intramuscular Q6H PRN Jennifer Anguiano MD    benztropine 1 mg Oral Q6H PRN Jennifer Anguiano MD    clonazePAM 0 5 mg Oral HS Bhaskar Mendoza MD    divalproex sodium 500 mg Oral BID Candace France MD    haloperidol 5 mg Oral Q6H PRN Jennifer Anguiano MD    haloperidol lactate 5 mg Intramuscular Q6H PRN Jennifer Anguiano MD    lithium carbonate 300 mg Oral Daily Candace France MD    lithium carbonate 450 mg Oral HS Candace France MD    LORazepam 1 mg Intramuscular Q6H PRN Jennifer Anguiano MD    LORazepam 1 mg Oral Q6H PRN Jennifer Anguiano MD    magnesium hydroxide 30 mL Oral Daily PRN Jennifer Anguiano MD    sodium chloride 125 mL/hr Intravenous Continuous Victoria Rodriges MD Last Rate: Stopped (08/12/19 0747)   sodium chloride 125 mL/hr Intravenous Continuous Pernell Mark DO Last Rate: Stopped (08/12/19 0830)   traZODone 50 mg Oral HS PRJJ Anguiano MD

## 2019-08-13 NOTE — PROGRESS NOTES
Pt is depressed  States that she wants to go home  Pt is concerned about her job  States that she wants to speak to CM about same  Pt states that she called last week to let them know that she was inn the hospital  Pt states that it should be CM to call and let her job know that she is still in the hospital  Pt encouraged to call her jopb today  Pt states that's he doesn't ant to go back, but that she has too  Pt isn't sure if the ECT is working  States that yesterday she felt very depressed after the ECT  Pt has a slighter brighter affect  Denies SI and hallucinations

## 2019-08-13 NOTE — PROGRESS NOTES
08/13/19 1115   Activity/Group Checklist   Group Other (Comment)  (Surviving Trauma Group/Education, Open Discussion)   Attendance Attended   Attendance Duration (min) 46-60   Interactions Interacted appropriately   Affect/Mood Appropriate   Goals Achieved Able to listen to others; Able to engage in interactions; Able to recieve feedback; Able to give feedback to another

## 2019-08-13 NOTE — PROGRESS NOTES
[facility] GROUP NOTE     08/13/19 0930   Activity/Group Checklist   Group Community meeting   Attendance Attended   Attendance Duration (min) 16-30   Interactions Interacted appropriately   Affect/Mood Appropriate   Goals Achieved Able to listen to others; Able to engage in interactions   Objectives/Key Points:  Living intentionally  Goal Setting  Thought for the Day  Self Check In

## 2019-08-13 NOTE — ANESTHESIA PREPROCEDURE EVALUATION
Review of Systems/Medical History  Patient summary reviewed  Chart reviewed  No history of anesthetic complications     Cardiovascular  Negative cardio ROS Hyperlipidemia,    Pulmonary  Smoker cigar smoker  , Tobacco cessation counseling given Cumulative Pack Years: 20, COPD mild- PRN medication , No sleep apnea ,        GI/Hepatic  Negative GI/hepatic ROS   Liver disease , alcohol related,        Negative  ROS        Endo/Other     GYN    Hysterectomy,        Hematology  Anemia iron deficiency anemia,     Musculoskeletal       Neurology  Negative neurology ROS      Psychology   Depression , being treated for depression,     Comment: ETOH abuse         Physical Exam    Airway    Mallampati score: II  TM Distance: >3 FB  Neck ROM: full     Dental       Cardiovascular  Comment: Negative ROS, Cardiovascular exam normal    Pulmonary  Pulmonary exam normal     Other Findings        Anesthesia Plan  ASA Score- 2     Anesthesia Type- general with ASA Monitors  Additional Monitors:   Airway Plan:     Comment: Chart reviewed and pt states no new changes in health  No prior ECT issues  No questions voiced  Pt re consents to ECT  Plan Factors-Patient not instructed to abstain from smoking on day of procedure  Patient did not smoke on day of surgery  Induction- intravenous  Postoperative Plan-     Informed Consent- Anesthetic plan and risks discussed with patient  I personally reviewed this patient with the CRNA  Discussed and agreed on the Anesthesia Plan with the CRNA  Sae Cardona             No results found for: HGBA1C    Lab Results   Component Value Date    K 4 2 08/06/2019     08/06/2019    CO2 27 08/06/2019    BUN 11 08/06/2019    CREATININE 0 74 08/06/2019    GLUF 76 08/06/2019    CALCIUM 8 7 08/06/2019    AST 15 08/06/2019    ALT 9 08/06/2019    ALKPHOS 33 (L) 08/06/2019    EGFR 98 08/06/2019       Lab Results   Component Value Date    WBC 5 40 08/06/2019    HGB 12 1 08/06/2019    HCT 36 6 08/06/2019 MCV 96 08/06/2019     08/06/2019   Normal sinus rhythm  Nonspecific ST and T wave abnormality  Abnormal ECG  When compared with ECG of 09-AUG-2019 16:10,  Questionable change in QRS axis  Nonspecific T wave abnormality, worse in Lateral leads  QT has shortened  Confirmed by Camila Brooks ([id number]) on 8/13/2019 8:30:34 AM

## 2019-08-13 NOTE — PROGRESS NOTES
08/13/19 0900   Team Meeting   Meeting Type Daily Rounds   Team Members Present   Team Members Present Physician;Nurse;;; Other (Discipline and Name)   Physician Team Member NANETTE CHURCH   Nursing Team Member [facility] Management Team Member Sb   Social Work Team Member Casey   Other (Discipline and Name) PharmD - Madi   Patient/Family Present   Patient Present No   Patient's Family Present No   Pt to continue ECT and possible discharge next week

## 2019-08-14 ENCOUNTER — ANESTHESIA (INPATIENT)
Dept: PREOP/PACU | Facility: HOSPITAL | Age: 44
DRG: 885 | End: 2019-08-14
Payer: COMMERCIAL

## 2019-08-14 ENCOUNTER — TELEPHONE (OUTPATIENT)
Dept: INTERNAL MEDICINE CLINIC | Facility: CLINIC | Age: 44
End: 2019-08-14

## 2019-08-14 ENCOUNTER — APPOINTMENT (INPATIENT)
Dept: PREOP/PACU | Facility: HOSPITAL | Age: 44
DRG: 885 | End: 2019-08-14
Payer: COMMERCIAL

## 2019-08-14 PROCEDURE — 90870 ELECTROCONVULSIVE THERAPY: CPT | Performed by: PSYCHIATRY & NEUROLOGY

## 2019-08-14 PROCEDURE — GZB0ZZZ ELECTROCONVULSIVE THERAPY, UNILATERAL-SINGLE SEIZURE: ICD-10-PCS | Performed by: PSYCHIATRY & NEUROLOGY

## 2019-08-14 PROCEDURE — 90870 ELECTROCONVULSIVE THERAPY: CPT

## 2019-08-14 PROCEDURE — 99232 SBSQ HOSP IP/OBS MODERATE 35: CPT | Performed by: STUDENT IN AN ORGANIZED HEALTH CARE EDUCATION/TRAINING PROGRAM

## 2019-08-14 RX ORDER — ONDANSETRON 2 MG/ML
INJECTION INTRAMUSCULAR; INTRAVENOUS AS NEEDED
Status: DISCONTINUED | OUTPATIENT
Start: 2019-08-14 | End: 2019-08-14 | Stop reason: SURG

## 2019-08-14 RX ORDER — SODIUM CHLORIDE 9 MG/ML
50 INJECTION, SOLUTION INTRAVENOUS CONTINUOUS
Status: DISCONTINUED | OUTPATIENT
Start: 2019-08-14 | End: 2019-08-16 | Stop reason: HOSPADM

## 2019-08-14 RX ORDER — KETOROLAC TROMETHAMINE 30 MG/ML
INJECTION, SOLUTION INTRAMUSCULAR; INTRAVENOUS AS NEEDED
Status: DISCONTINUED | OUTPATIENT
Start: 2019-08-14 | End: 2019-08-14 | Stop reason: SURG

## 2019-08-14 RX ORDER — SUCCINYLCHOLINE/SOD CL,ISO/PF 100 MG/5ML
SYRINGE (ML) INTRAVENOUS AS NEEDED
Status: DISCONTINUED | OUTPATIENT
Start: 2019-08-14 | End: 2019-08-14 | Stop reason: SURG

## 2019-08-14 RX ORDER — ETOMIDATE 2 MG/ML
INJECTION INTRAVENOUS AS NEEDED
Status: DISCONTINUED | OUTPATIENT
Start: 2019-08-14 | End: 2019-08-14 | Stop reason: SURG

## 2019-08-14 RX ORDER — ESMOLOL HYDROCHLORIDE 10 MG/ML
INJECTION INTRAVENOUS AS NEEDED
Status: DISCONTINUED | OUTPATIENT
Start: 2019-08-14 | End: 2019-08-14 | Stop reason: SURG

## 2019-08-14 RX ADMIN — DIVALPROEX SODIUM 500 MG: 500 TABLET, DELAYED RELEASE ORAL at 08:16

## 2019-08-14 RX ADMIN — Medication 80 MG: at 07:04

## 2019-08-14 RX ADMIN — SODIUM CHLORIDE 50 ML/HR: 0.9 INJECTION, SOLUTION INTRAVENOUS at 06:05

## 2019-08-14 RX ADMIN — ETOMIDATE 60 MG: 2 INJECTION, SOLUTION INTRAVENOUS at 07:04

## 2019-08-14 RX ADMIN — LITHIUM CARBONATE 300 MG: 300 TABLET, FILM COATED, EXTENDED RELEASE ORAL at 08:16

## 2019-08-14 RX ADMIN — LITHIUM CARBONATE 450 MG: 450 TABLET, EXTENDED RELEASE ORAL at 21:24

## 2019-08-14 RX ADMIN — TRAZODONE HYDROCHLORIDE 50 MG: 50 TABLET ORAL at 21:23

## 2019-08-14 RX ADMIN — ONDANSETRON HYDROCHLORIDE 4 MG: 2 INJECTION, SOLUTION INTRAMUSCULAR; INTRAVENOUS at 07:01

## 2019-08-14 RX ADMIN — KETOROLAC TROMETHAMINE 30 MG: 30 INJECTION, SOLUTION INTRAMUSCULAR; INTRAVENOUS at 07:01

## 2019-08-14 RX ADMIN — ESMOLOL HYDROCHLORIDE 50 MG: 10 INJECTION, SOLUTION INTRAVENOUS at 07:11

## 2019-08-14 RX ADMIN — DIVALPROEX SODIUM 500 MG: 500 TABLET, DELAYED RELEASE ORAL at 17:17

## 2019-08-14 RX ADMIN — CLONAZEPAM 0.5 MG: 0.5 TABLET ORAL at 21:24

## 2019-08-14 NOTE — PROGRESS NOTES
Progress Note - Behavioral Health   Jaja Land 38 y o  female MRN: [id number]  Unit/Bed#: [facility] 350-01 Encounter: [id number]    The patient was seen for continuing care and reviewed with treatment team   Patient has a brighter affect and less irritable  Patient reports feeling better and stated that she is ready for discharge  Patient stated that she would like to be discharged later this week and will continue ECT as an outpatient  Patient reports good sleep and good appetite  Patient reports feeling less depressed  Patient also was out of room yesterday and participating in unit activities  Patient denies side effects  Patient denies suicidal or homicidal ideation and denies auditory or visual hallucinations  Mental Status Evaluation:    Appearance:  casually dressed   Behavior:  cooperative   Speech:  normal rate and volume   Mood:  improved   Affect:  brighter, constricted   Thought Process:  coherent   Associations: intact associations   Thought Content:  no overt delusions   Perceptual Disturbances: no auditory hallucinations, no visual hallucinations   Risk Potential: Suicidal ideation - None  Homicidal ideation - None  Potential for aggression - No   Sensorium:  oriented to person, place and time/date   Memory:  recent and remote memory grossly intact   Consciousness:  alert and awake   Attention: attention span and concentration appear shorter than expected for age   Insight:  fair   Judgment: fair   Gait/Station: in bed   Motor Activity: no abnormal movements       Vitals:    08/14/19 1050   BP: 98/53   Pulse: 71   Resp: 16   Temp:    SpO2:        Assessment/Plan    Principal Problem:    Bipolar I disorder, severe, current or most recent episode depressed, with psychotic features ([facility] [address] )      Recommended Treatment:  Continue present medications and ECT treatment  Continue with pharmacotherapy, group therapy, milieu therapy and occupational therapy    The patient will be maintained on the following medications:    Current Facility-Administered Medications:  acetaminophen 650 mg Oral Q6H PRN Justyna Marie MD    acetaminophen 650 mg Oral Q4H PRN Cielo Schultz MD    acetaminophen 975 mg Oral Q6H PRN Cielo Schultz, MD    aluminum-magnesium hydroxide-simethicone 30 mL Oral Q4H PRN Justyna Marie MD    benztropine 1 mg Intramuscular Q6H PRJJ Marie MD    benztropine 1 mg Oral Q6H PRN Justyna Marie MD    clonazePAM 0 5 mg Oral HS Bhaskar Mendoza MD    divalproex sodium 500 mg Oral BID Cielo Schultz MD    haloperidol 5 mg Oral Q6H PRN Justyna Marie MD    haloperidol lactate 5 mg Intramuscular Q6H PRN Justyna Marie MD    lithium carbonate 300 mg Oral Daily Cielo Schultz MD    lithium carbonate 450 mg Oral HS Cielo Schultz MD    LORazepam 1 mg Intramuscular Q6H PRN Justyna Marie MD    LORazepam 1 mg Oral Q6H PRN Justyna Marie MD    magnesium hydroxide 30 mL Oral Daily PRN Justyna Marie MD    sodium chloride 125 mL/hr Intravenous Continuous Katie Richards MD Last Rate: Stopped (08/12/19 0747)   sodium chloride 125 mL/hr Intravenous Continuous Dorna Bhavin,  Last Rate: Stopped (08/12/19 0830)   sodium chloride 50 mL/hr Intravenous Continuous Elina Warren MD Last Rate: Stopped (08/14/19 0735)   traZODone 50 mg Oral HS PRN Justyna Marie MD

## 2019-08-14 NOTE — ANESTHESIA PREPROCEDURE EVALUATION
Review of Systems/Medical History  Patient summary reviewed  Chart reviewed  No history of anesthetic complications     Cardiovascular  Negative cardio ROS Hyperlipidemia,    Pulmonary  Smoker cigar smoker  , Tobacco cessation counseling given Cumulative Pack Years: 20, COPD mild- PRN medication , No sleep apnea ,        GI/Hepatic  Negative GI/hepatic ROS   Liver disease , alcohol related,        Negative  ROS        Endo/Other     GYN    Hysterectomy,        Hematology  Anemia iron deficiency anemia,     Musculoskeletal       Neurology  Negative neurology ROS      Psychology   Depression , being treated for depression,     Comment: ETOH abuse         Physical Exam    Airway    Mallampati score: II  TM Distance: >3 FB  Neck ROM: full     Dental       Cardiovascular  Comment: Negative ROS, Cardiovascular exam normal    Pulmonary  Pulmonary exam normal     Other Findings        Anesthesia Plan  ASA Score- 2     Anesthesia Type- general with ASA Monitors  Additional Monitors:   Airway Plan:     Comment: Chart reviewed and pt states no new changes in health  No prior ECT issues  No questions voiced  Pt re consents to ECT 8/14  Plan Factors-Patient not instructed to abstain from smoking on day of procedure  Patient did not smoke on day of surgery  Induction- intravenous  Postoperative Plan-     Informed Consent- Anesthetic plan and risks discussed with patient  I personally reviewed this patient with the CRNA  Discussed and agreed on the Anesthesia Plan with the CRNA  Popeye Garces             No results found for: HGBA1C    Lab Results   Component Value Date    K 4 2 08/06/2019     08/06/2019    CO2 27 08/06/2019    BUN 11 08/06/2019    CREATININE 0 74 08/06/2019    GLUF 76 08/06/2019    CALCIUM 8 7 08/06/2019    AST 15 08/06/2019    ALT 9 08/06/2019    ALKPHOS 33 (L) 08/06/2019    EGFR 98 08/06/2019       Lab Results   Component Value Date    WBC 5 40 08/06/2019    HGB 12 1 08/06/2019    HCT 36 6 08/06/2019 MCV 96 08/06/2019     08/06/2019   Normal sinus rhythm  Nonspecific ST and T wave abnormality  Abnormal ECG  When compared with ECG of 09-AUG-2019 16:10,  Questionable change in QRS axis  Nonspecific T wave abnormality, worse in Lateral leads  QT has shortened  Confirmed by Camila Brooks ([id number]) on 8/13/2019 8:30:34 AM

## 2019-08-14 NOTE — PROGRESS NOTES
Pt attended stress management group  Pt anxous and able to self reflect  Pt was able to verbalize needs  Pt expressed concerns about paperwork to be completed for [employer]  (CM f/u with pt after group)  Pt displayed positive self esteem and utilized positive coping skills in prompted self expression exercise  Pt able to monitor stress and self monitor  Pt respectful with peers  Continue to provide therapeutic group support  08/14/19 1115   Activity/Group Checklist   Group Other (Comment)  (stress manangement)   Attendance Attended   Attendance Duration (min) 46-60   Interactions Interacted appropriately   Affect/Mood Other (Comment)  (anxious)   Goals Achieved Identified feelings; Discussed coping strategies; Able to reflect/comment on own behavior;Able to manage/cope with feelings;Verbalized increased hopefulness; Able to self-disclose; Able to recieve feedback; Able to give feedback to another;Able to experience relief/decrease in symptoms

## 2019-08-14 NOTE — PROGRESS NOTES
Patient spent most of the morning; until 10 am or so resting in bed  Showered and has been visible in the milieu the rest of the shift  Denies SI; pleasant  Attending groups  Compliant with mediations  Will monitor

## 2019-08-14 NOTE — DISCHARGE INSTR - OTHER ORDERS
[facility] - [phone]    [facility] [address] Hotline # - [phone]

## 2019-08-14 NOTE — PLAN OF CARE
Problem: DEPRESSION  Goal: Will be euthymic at discharge  Description  INTERVENTIONS:  - Administer medication as ordered  - Provide emotional support via 1:1 interaction with staff  - Encourage involvement in milieu/groups/activities  - Monitor for social isolation  Outcome: Progressing     Problem: SUBSTANCE USE/ABUSE  Goal: By discharge, will develop insight into their chemical dependency and sustain motivation to continue in recovery  Description  INTERVENTIONS:  - Attends all daily group sessions and scheduled AA groups  - Actively practices coping skills through participation in the therapeutic community and adherence to program rules  - Reviews and completes assignments from individual treatment plan  - Assist patient development of understanding of their personal cycle of addiction and relapse triggers  Outcome: Progressing  Goal: By discharge, patient will have ongoing treatment plan addressing chemical dependency  Description  INTERVENTIONS:  - Assist patient with resources and/or appointments for ongoing recovery based living  Outcome: Progressing     Problem: Risk for Self Injury/Neglect  Goal: Treatment Goal: Remain safe during length of stay, learn and adopt new coping skills, and be free of self-injurious ideation, impulses and acts at the time of discharge  Description  Pt will verbalize thoughts and feelings regarding depression/ SI    Outcome: Progressing  Goal: Verbalize thoughts and feelings  Description  Interventions:  - Assess and re-assess patient's lethality and potential for self-injury  - Engage patient in 1:1 interactions, daily, for a minimum of 15 minutes  - Encourage patient to express feelings, fears, frustrations, hopes  - Establish rapport/trust with patient   Outcome: Progressing  Goal: Refrain from harming self  Description  Interventions:  - Monitor patient closely, per order  - Develop a trusting relationship  - Supervise medication ingestion, monitor effects and side effects Outcome: Progressing  Goal: Recognize maladaptive responses and adopt new coping mechanisms  Description  Pt will start to realize poor respond to depression/SI and will develop positive coping skills   Outcome: Progressing     Problem: PSYCHOSIS  Goal: Will report no hallucinations or delusions  Description  Interventions:  - Administer medication as  ordered  - Every waking shifts and PRN assess for the presence of hallucinations and or delusions  - Assist with reality testing to support increasing orientation  - Assess if patient's hallucinations or delusions are encouraging self-harm or harm to others and intervene as appropriate  Outcome: Progressing     Problem: SLEEP DISTURBANCE  Goal: Will exhibit normal sleeping pattern  Description  Interventions:  -  Assess the patients sleep pattern, noting recent changes  - Administer medication as ordered  - Decrease environmental stimuli, including noise, as appropriate during the night  - Encourage the patient to actively participate in unit groups and or exercise during the day to enhance ability to achieve adequate sleep at night  - Assess the patient, in the morning, encouraging a description of sleep experience  Outcome: Progressing     Problem: DISCHARGE PLANNING  Goal: Discharge to home or other facility with appropriate resources  Description  INTERVENTIONS:  - Identify barriers to discharge w/patient and caregiver  - Arrange for needed discharge resources and transportation as appropriate  - Identify discharge learning needs (meds, wound care, etc )  - Refer to Case Management Department for coordinating discharge planning if the patient needs post-hospital services based on physician/advanced practitioner order or complex needs related to functional status, cognitive ability, or social support system   Outcome: Progressing     Problem: Ineffective Coping  Goal: Participates in unit activities  Description  Interventions:  - Provide therapeutic environment - Provide required programming   - Redirect inappropriate behaviors   Outcome: Progressing     Problem: Electroconvulsive therapy (ECT)  Goal: Verbalizes/displays adequate comfort level or baseline comfort level  Description  Interventions:  - Encourage patient to monitor pain and request assistance  - Assess pain using appropriate pain scale  - Administer analgesics based on type and severity of pain and evaluate response  - Implement non-pharmacological measures as appropriate and evaluate response  - Consider cultural and social influences on pain and pain management  - Notify physician/advanced practitioner if interventions unsuccessful or patient reports new pain  Outcome: Progressing  Goal: Achieves stable or improved neurological status  Description  INTERVENTIONS  - Monitor and report changes in neurological status  - Initiate measures to prevent increased intracranial pressure  - Maintain blood pressure and fluid volume within ordered parameters to optimize cerebral perfusion  - Monitor temperature, glucose, and sodium or any other associated labs   Initiate appropriate interventions as ordered  - Monitor for seizure activity   - Administer anti-seizure medications as ordered  Outcome: Progressing  Goal: Achieves maximal functionality and self care  Description  INTERVENTIONS  - Monitor swallowing and airway patency with patient fatigue and changes in neurological status  - Encourage and assist patient to increase activity and self care with guidance from rehab services  - Encourage visually impaired, hearing impaired and aphasic patients to use assistive/communication devices  Outcome: Progressing

## 2019-08-14 NOTE — PROGRESS NOTES
Pt denies all symptoms  Pt is about the unit and social with peers  Pt is calm, cooperative and pleasant  Pt states the first day of ECT was rough but has been getting better  Compliant with medications  Will monitor

## 2019-08-14 NOTE — PROGRESS NOTES
Patient arrived back on the unit from ECT  No complaints of headache  States that she is feeling "fine"  Will monitor

## 2019-08-14 NOTE — OP NOTE
Procedure Note - ECT  Joy Garcia 38 y o  female MRN: [id number]    Time out was taken with staff to confirm correct patient and correct procedure to be performed  Pt was anxious, less depressed  Restricted affect  Session Number: 004    Diagnosis: MDD    ECT Type: Inpatient, Acute    Anesthesia: Brevital    Electrode Placement: right unilateral    Energy level: 50      Seizure Duration     EE sec  EM sec     Results:Clinical seizure was satisfactory, Patient tolerated ECT well  Zofran and Toradol were provided to treat nausea and headaches  Pt had local rash on her left hand, propagated to a left side of her chest  Will consider changing Toradol, that is a likely a contributor to a different pain medicine to address patient's headaches

## 2019-08-14 NOTE — CASE MANAGEMENT
Rec;d message on 8/13 @ 5:18 PM from [address]  Said that pt had apt on 6/20 @ 11 AM with Dr Cammie Zambrano Dr advised SW that pt to be D/C on Fri, 8/16  Said that pt would follow-up with out pt ECT  SW met with pt  Had ECT #3 today  Pt denied having any side effects from tx  Pt said she was going to be D/C on Fri  Talked about paperwork she'd need for her job  GONZALEZ advised a letter confirming her admission & D/C would be completed on day of D/C  Pt spoke about other paperwork (FMLA?)  Pt said she already spoke to someone at her job  Pt was told that the paperwork was sent out  SW asked where it was sent to  Pt didn't know  GONZALEZ suggested that she call again to request they send paperwork again  Pt said she'd do that  Seemed a bit more upbeat  Denied SI  Reported feeling better

## 2019-08-14 NOTE — PROGRESS NOTES
Pt mainly seclusive to room  Came out for meds when requested by RN  Preoccupied with FMLA paperwork/work situation, asking for assistance and states she does not know who to ask  Referred to CM and electronic msg left for CM  Pt requested and given trazodone for sleep and retired to bed

## 2019-08-14 NOTE — PROGRESS NOTES
08/14/19 0900   Team Meeting   Meeting Type Daily Rounds   Team Members Present   Team Members Present Physician;Nurse;   Physician Team Member NANETTE TRUJILLO [facility]   [facility]   Care Management Team Member Saulo   Social Work Team Member Casey   Patient/Family Present   Patient Present No   Patient's Family Present No   Will continue with ECT treatments  No medication changes will take place  Will discharge next Wednesday

## 2019-08-14 NOTE — ANESTHESIA POSTPROCEDURE EVALUATION
Post-Op Assessment Note    CV Status:  Stable    Pain management: adequate     Mental Status:  Confused   Hydration Status:  Stable   PONV Controlled:  Controlled   Airway Patency:  Patent   Post Op Vitals Reviewed: Yes      Staff: CRNA           BP      Temp     Pulse     Resp      SpO2

## 2019-08-15 ENCOUNTER — ANESTHESIA EVENT (INPATIENT)
Dept: PREOP/PACU | Facility: HOSPITAL | Age: 44
DRG: 885 | End: 2019-08-15
Payer: COMMERCIAL

## 2019-08-15 PROCEDURE — 99232 SBSQ HOSP IP/OBS MODERATE 35: CPT | Performed by: STUDENT IN AN ORGANIZED HEALTH CARE EDUCATION/TRAINING PROGRAM

## 2019-08-15 RX ADMIN — LITHIUM CARBONATE 450 MG: 450 TABLET, EXTENDED RELEASE ORAL at 21:13

## 2019-08-15 RX ADMIN — DIVALPROEX SODIUM 500 MG: 500 TABLET, DELAYED RELEASE ORAL at 08:24

## 2019-08-15 RX ADMIN — LITHIUM CARBONATE 300 MG: 300 TABLET, FILM COATED, EXTENDED RELEASE ORAL at 08:24

## 2019-08-15 RX ADMIN — DIVALPROEX SODIUM 500 MG: 500 TABLET, DELAYED RELEASE ORAL at 17:19

## 2019-08-15 RX ADMIN — CLONAZEPAM 0.5 MG: 0.5 TABLET ORAL at 21:13

## 2019-08-15 RX ADMIN — TRAZODONE HYDROCHLORIDE 50 MG: 50 TABLET ORAL at 21:13

## 2019-08-15 NOTE — CASE MANAGEMENT
Rec'd call from LAW Newton  He scheduled intake appt on 8/26 @ 5 PM with Elie Pérez, counselor  He said there  was a $15 co-pay each time pt is seen for an appt  GONZALEZ listed the co-pay amt on pt's D/C form

## 2019-08-15 NOTE — CASE MANAGEMENT
GONZALEZ called ECT scheduling, [phone], to confirm out pt ECT dates of 8/19 & 8/21  Spoke to Lukas Bernabe, who confirmed dates  SW asked what time pt had to be at hospital  She said 11 A & 10 A (???)  SW called PACU, [phone]  Spoke to Jt Baum, who said that ECT was not scheduled that late in the morning  She said pt would be called the night before ECT to tell her what time to be at hospital      GONZALEZ called [place], [phone], to schedule intake appt  Left message @ 11:30 AM for Dameon Anne

## 2019-08-15 NOTE — PROGRESS NOTES
Pt presents as depressed  Pt states that she is depressed because she will have to take medication and see doctors for depression the rest of her life  Pt denies SI and hallucinations

## 2019-08-15 NOTE — CASE MANAGEMENT
SW call pt's daughter, Cornelius Crawford, [phone]  Left message advising that pt to be D/C tomorrow  Asked if she or her brother would pick pt up & at what time  Called [facility] to schedule intake appt   Left 2nd message for Julio César Lyons @ 2:40 PM

## 2019-08-15 NOTE — PROGRESS NOTES
[facility] GROUP NOTE     08/14/19 1415   Activity/Group Checklist   Group Personal control  (Positive Messaging)   Attendance Attended   Attendance Duration (min) 31-45   Interactions Interacted appropriately  (quiet/scant)   Affect/Mood Appropriate   Goals Achieved Able to listen to others; Able to engage in interactions   Identified emotions associated with negative self messages  Identified desired beliefs system  Participated in creating a positive messaging tool for carry over in personal wellness routine

## 2019-08-15 NOTE — PROGRESS NOTES
08/15/19 0910   Team Meeting   Meeting Type Daily Rounds   Team Members Present   Team Members Present Physician;Nurse;;   Physician Team Member NANETTE TRUJILLO [facility]   Nursing Team Member Apollo   Care Management Team Member Saulo   Social Work Team Member Casey   Patient/Family Present   Patient Present No   Patient's Family Present No     Pt will have ECT tomorrow and then will be discharged  Will continue with out patient ECT  CM will set up outpt D&A follow up

## 2019-08-15 NOTE — PROGRESS NOTES
[facility] GROUP NOTE     08/15/19 0930   Activity/Group Checklist   Group Community meeting   Attendance Attended   Attendance Duration (min) 16-30   Interactions Interacted appropriately   Affect/Mood Appropriate   Goals Achieved Able to listen to others; Able to engage in interactions   Objectives/Key Points:  Living intentionally  Goal Setting  Thought for the Day  Self Check In

## 2019-08-15 NOTE — CASE MANAGEMENT
Rec'd call from pt's daughter, Brody Velasquez  She was aware that pt ot be D/C tomorrow  SW asked how she felt pt was doing  Brodymirtha Velasquez said she talked to pt today & has been visiting  She said that pt "was doing better"  SW asked who'd pick pt up tomorrow  She said "they" were going to visit pt tonight & discuss that  Brody Velasquez said either she or her brother would come for pt @ D/C

## 2019-08-15 NOTE — PROGRESS NOTES
Pt denies all symptoms  Pt is calm, cooperative and pleasant  Pt states she is excited for d/c tomorrow  Compliant with medications  Will monitor

## 2019-08-15 NOTE — PROGRESS NOTES
Progress Note - Behavioral Health   Deshaun Segura 38 y o  female MRN: [id number]  Unit/Bed#: [facility] 350-01 Encounter: [id number]    The patient was seen for continuing care and reviewed with treatment team   Patient reports feeling better, less depressed and less anxious, with a brighter affect and stated she has not been to any hospital   Patient is out of room, participating in unit activities and interacting with peers  She stated that the last time she had suicidal thoughts was 3 days ago  Patient denies suicidal ideation intent or plan at this time  Patient reports fair sleep and good appetite  Patient continues to ask to be discharged tomorrow and that she will continue ECT as an outpatient  Patient denies auditory or visual hallucinations  Patient denies side effects  Mental Status Evaluation:    Appearance:  casually dressed   Behavior:  cooperative   Speech:  normal rate and volume   Mood:  improved   Affect:  brighter   Thought Process:  coherent   Associations: intact associations   Thought Content:  no overt delusions   Perceptual Disturbances: no auditory hallucinations, no visual hallucinations   Risk Potential: Suicidal ideation - None  Homicidal ideation - None  Potential for aggression - No   Sensorium:  oriented to person, place and time/date   Memory:  recent and remote memory grossly intact   Consciousness:  alert and awake   Attention: attention span and concentration are age appropriate   Insight:  fair   Judgment: fair   Gait/Station: normal gait/station   Motor Activity: no abnormal movements       Vitals:    08/15/19 0715   BP: 95/58   Pulse: 83   Resp: 16   Temp: 98 °F (36 7 °C)   SpO2:        Assessment/Plan    Principal Problem:    Bipolar I disorder, severe, current or most recent episode depressed, with psychotic features ([facility] [address] )      Recommended Treatment:  Continue present medications  Patient  is to be discharged later tomorrow after 4th ECT    Patient stated that she will continue ECT next week as an outpatient  Continue with pharmacotherapy, group therapy, milieu therapy and occupational therapy    The patient will be maintained on the following medications:    Current Facility-Administered Medications:  acetaminophen 650 mg Oral Q6H PRN Kimberly Pozo MD    acetaminophen 650 mg Oral Q4H PRN Sera West MD    acetaminophen 975 mg Oral Q6H PRN Sera West MD    aluminum-magnesium hydroxide-simethicone 30 mL Oral Q4H PRN Kimberly Pozo MD    benztropine 1 mg Intramuscular Q6H PRN Kimberly Pozo MD    benztropine 1 mg Oral Q6H PRN Kimberly Pozo MD    clonazePAM 0 5 mg Oral HS Bhaskar Mendoza MD    divalproex sodium 500 mg Oral BID Sera West MD    haloperidol 5 mg Oral Q6H PRN Kimberly Pozo MD    haloperidol lactate 5 mg Intramuscular Q6H PRN Kimberly Pozo MD    lithium carbonate 300 mg Oral Daily Sera Wset MD    lithium carbonate 450 mg Oral HS Sera West MD    LORazepam 1 mg Intramuscular Q6H PRN Kimberly Pozo MD    LORazepam 1 mg Oral Q6H PRN Kimberly oPzo MD    magnesium hydroxide 30 mL Oral Daily PRN Kimberly Pozo MD    sodium chloride 125 mL/hr Intravenous Continuous Dasha Fowler MD Last Rate: Stopped (08/12/19 0747)   sodium chloride 125 mL/hr Intravenous Continuous Yeny Solorzano DO Last Rate: Stopped (08/12/19 0830)   sodium chloride 50 mL/hr Intravenous Continuous Maida Aguirre MD Last Rate: Stopped (08/14/19 0735)   traZODone 50 mg Oral HS PRN Kimberly Pozo MD

## 2019-08-15 NOTE — ANESTHESIA PREPROCEDURE EVALUATION
Review of Systems/Medical History  Patient summary reviewed  Chart reviewed  No history of anesthetic complications     Cardiovascular  Exercise tolerance (METS): >4,  Hyperlipidemia, Hypertension controlled,    Pulmonary  Smoker ex-smoker  , No COPD , No asthma , No sleep apnea ,        GI/Hepatic  Negative GI/hepatic ROS          Negative  ROS        Endo/Other  Negative endo/other ROS      GYN    Hysterectomy,        Hematology  Anemia (no longer anemic) ,     Musculoskeletal    No arthritis     Neurology  Negative neurology ROS      Psychology   Depression , bipolar disorder and being treated for depression,     Comment: Hx of drug and ETOH abuse         Physical Exam    Airway    Mallampati score: II  TM Distance: >3 FB  Neck ROM: full     Dental       Cardiovascular  Cardiovascular exam normal    Pulmonary  Pulmonary exam normal     Other Findings        Anesthesia Plan  ASA Score- 2     Anesthesia Type- general with ASA Monitors  Additional Monitors:   Airway Plan:     Comment: Chart reviewed and pt states no new changes in health  No prior ECT issues  No questions voiced  Pt re consents to ECT  Old charts reviewed   Pt known to me  Plan Factors-Patient not instructed to abstain from smoking on day of procedure  Patient did not smoke on day of surgery  Induction- intravenous  Postoperative Plan-     Informed Consent- Anesthetic plan and risks discussed with patient  I personally reviewed this patient with the CRNA  Discussed and agreed on the Anesthesia Plan with the KERI Garduno

## 2019-08-15 NOTE — CASE MANAGEMENT
Met with pt  Asked pt how she was feeling  Pt said it was difficult to tell, as she was in the hospital & not back home yet  Said she wasn't crying any more  Pt calm  Denied SI  Pt looking forward to D/C tomorrow  Will have last in pt ECT tx tomorrow before D/C  GONZALEZ spoke to pt re: need for D&A follow-up, as pt scored a 12 on the Audit  At first, pt said she wouldn't have time, due to working FT  GONZALEZ explained that an evening appt could be made  "Do I have to go?" GONZALEZ said it was recommended  Pt agreed  Signed ZOEY for [facility]  GONZALEZ explained re: the location  SW asked pt if she had any ETOH in the house  Pt said no  SW asked who'd pick her up @ D/C  Pt said either her son or her daughter  Said son's phone was not working  Told SW to call Dayana Gibbs, her daughter  Pt talked about coming for out pt ECT on Mon, 8/19 & Wed, 8/21  Good eye contact  Pleasant  Calm  Engaged easily in conversation

## 2019-08-16 ENCOUNTER — APPOINTMENT (INPATIENT)
Dept: PREOP/PACU | Facility: HOSPITAL | Age: 44
DRG: 885 | End: 2019-08-16
Payer: COMMERCIAL

## 2019-08-16 ENCOUNTER — ANESTHESIA (INPATIENT)
Dept: PREOP/PACU | Facility: HOSPITAL | Age: 44
DRG: 885 | End: 2019-08-16
Payer: COMMERCIAL

## 2019-08-16 VITALS
SYSTOLIC BLOOD PRESSURE: 94 MMHG | OXYGEN SATURATION: 96 % | DIASTOLIC BLOOD PRESSURE: 54 MMHG | TEMPERATURE: 98 F | WEIGHT: 154.2 LBS | HEIGHT: 63 IN | HEART RATE: 61 BPM | BODY MASS INDEX: 27.32 KG/M2 | RESPIRATION RATE: 16 BRPM

## 2019-08-16 PROCEDURE — 90870 ELECTROCONVULSIVE THERAPY: CPT | Performed by: PSYCHIATRY & NEUROLOGY

## 2019-08-16 PROCEDURE — 90870 ELECTROCONVULSIVE THERAPY: CPT

## 2019-08-16 PROCEDURE — GZB0ZZZ ELECTROCONVULSIVE THERAPY, UNILATERAL-SINGLE SEIZURE: ICD-10-PCS | Performed by: PSYCHIATRY & NEUROLOGY

## 2019-08-16 PROCEDURE — 99239 HOSP IP/OBS DSCHRG MGMT >30: CPT | Performed by: STUDENT IN AN ORGANIZED HEALTH CARE EDUCATION/TRAINING PROGRAM

## 2019-08-16 RX ORDER — ESMOLOL HYDROCHLORIDE 10 MG/ML
INJECTION INTRAVENOUS AS NEEDED
Status: DISCONTINUED | OUTPATIENT
Start: 2019-08-16 | End: 2019-08-16 | Stop reason: SURG

## 2019-08-16 RX ORDER — CLONAZEPAM 0.5 MG/1
0.5 TABLET ORAL
Qty: 30 TABLET | Refills: 0 | Status: SHIPPED | OUTPATIENT
Start: 2019-08-16 | End: 2019-08-23 | Stop reason: SDUPTHER

## 2019-08-16 RX ORDER — LITHIUM CARBONATE 300 MG/1
300 TABLET, FILM COATED, EXTENDED RELEASE ORAL DAILY
Qty: 30 TABLET | Refills: 0 | Status: SHIPPED | OUTPATIENT
Start: 2019-08-17 | End: 2019-08-23 | Stop reason: SDUPTHER

## 2019-08-16 RX ORDER — SODIUM CHLORIDE 9 MG/ML
125 INJECTION, SOLUTION INTRAVENOUS CONTINUOUS
Status: DISCONTINUED | OUTPATIENT
Start: 2019-08-16 | End: 2019-08-16 | Stop reason: HOSPADM

## 2019-08-16 RX ORDER — ONDANSETRON 2 MG/ML
INJECTION INTRAMUSCULAR; INTRAVENOUS AS NEEDED
Status: DISCONTINUED | OUTPATIENT
Start: 2019-08-16 | End: 2019-08-16 | Stop reason: SURG

## 2019-08-16 RX ORDER — GLYCOPYRROLATE 0.2 MG/ML
INJECTION INTRAMUSCULAR; INTRAVENOUS AS NEEDED
Status: DISCONTINUED | OUTPATIENT
Start: 2019-08-16 | End: 2019-08-16 | Stop reason: SURG

## 2019-08-16 RX ORDER — LITHIUM CARBONATE 450 MG
450 TABLET, EXTENDED RELEASE ORAL
Qty: 30 TABLET | Refills: 0 | Status: SHIPPED | OUTPATIENT
Start: 2019-08-16 | End: 2019-08-23 | Stop reason: SDUPTHER

## 2019-08-16 RX ORDER — ETOMIDATE 2 MG/ML
INJECTION INTRAVENOUS AS NEEDED
Status: DISCONTINUED | OUTPATIENT
Start: 2019-08-16 | End: 2019-08-16 | Stop reason: SURG

## 2019-08-16 RX ORDER — SUCCINYLCHOLINE/SOD CL,ISO/PF 100 MG/5ML
SYRINGE (ML) INTRAVENOUS AS NEEDED
Status: DISCONTINUED | OUTPATIENT
Start: 2019-08-16 | End: 2019-08-16 | Stop reason: SURG

## 2019-08-16 RX ORDER — SODIUM CHLORIDE 9 MG/ML
INJECTION, SOLUTION INTRAVENOUS CONTINUOUS PRN
Status: DISCONTINUED | OUTPATIENT
Start: 2019-08-16 | End: 2019-08-16 | Stop reason: SURG

## 2019-08-16 RX ORDER — DIVALPROEX SODIUM 500 MG/1
500 TABLET, DELAYED RELEASE ORAL 2 TIMES DAILY
Qty: 60 TABLET | Refills: 0 | Status: SHIPPED | OUTPATIENT
Start: 2019-08-16 | End: 2019-08-23 | Stop reason: SDUPTHER

## 2019-08-16 RX ADMIN — GLYCOPYRROLATE 0.2 MG: 0.2 INJECTION, SOLUTION INTRAMUSCULAR; INTRAVENOUS at 07:07

## 2019-08-16 RX ADMIN — SODIUM CHLORIDE: 0.9 INJECTION, SOLUTION INTRAVENOUS at 06:27

## 2019-08-16 RX ADMIN — LITHIUM CARBONATE 300 MG: 300 TABLET, FILM COATED, EXTENDED RELEASE ORAL at 08:14

## 2019-08-16 RX ADMIN — ONDANSETRON HYDROCHLORIDE 4 MG: 2 INJECTION, SOLUTION INTRAMUSCULAR; INTRAVENOUS at 07:07

## 2019-08-16 RX ADMIN — DIVALPROEX SODIUM 500 MG: 500 TABLET, DELAYED RELEASE ORAL at 08:14

## 2019-08-16 RX ADMIN — ETOMIDATE 12 MG: 2 INJECTION, SOLUTION INTRAVENOUS at 07:13

## 2019-08-16 RX ADMIN — Medication 80 MG: at 07:13

## 2019-08-16 RX ADMIN — ESMOLOL HYDROCHLORIDE 50 MG: 10 INJECTION, SOLUTION INTRAVENOUS at 07:13

## 2019-08-16 RX ADMIN — ACETAMINOPHEN 650 MG: 325 TABLET ORAL at 08:14

## 2019-08-16 RX ADMIN — SODIUM CHLORIDE 125 ML/HR: 0.9 INJECTION, SOLUTION INTRAVENOUS at 06:23

## 2019-08-16 NOTE — DISCHARGE INSTR - LAB
Contact Information: If you have any questions, concerns, pended studies, tests and/or procedures, or emergencies regarding your inpatient behavioral health visit  Please contact [facility] behavioral health unit [facility] [phone]  and ask to speak to a , nurse or physician  A contact is available 24 hours/ 7 days a week at this number  Summary of Procedures Performed During your Stay:  Below is a list of major procedures performed during your hospital stay and a summary of results:  - No major procedures performed  Pending Studies (From admission, onward)    None        If studies are pending at discharge, follow up with your PCP and/or referring provider

## 2019-08-16 NOTE — PROGRESS NOTES
08/16/19 0958   Team Meeting   Meeting Type Daily Rounds   Team Members Present   Team Members Present Physician;;Nurse;; Other (Discipline and Name)   Physician Team Member [address] Team Member ARNOL HOLDEN [facility] Management Team Member Saulo   Social Work Team Member Rice   Other (Discipline and Name) Madi- pharmacist   Patient/Family Present   Patient Present No   Patient's Family Present No     Pt will be d/c today  Will do ECT outpatient

## 2019-08-16 NOTE — PROGRESS NOTES
Pt returned from ECT at 07:51  Pt c/o frontal headache and received Tylenol prn for same  No other c/o pain  Affect is slightly brighter  Pt has some short term memory loss  Denies all symptoms and feels ready for d/c

## 2019-08-16 NOTE — ANESTHESIA POSTPROCEDURE EVALUATION
Post-Op Assessment Note    CV Status:  Stable  Pain Score: 0    Pain management: satisfactory to patient     Mental Status:  Alert   Hydration Status:  Stable   PONV Controlled:  Controlled   Airway Patency:  Patent   Post Op Vitals Reviewed: Yes      Staff: CRNA           BP      Temp      Pulse     Resp      SpO2

## 2019-08-16 NOTE — PROGRESS NOTES
[facility] GROUP NOTE     08/16/19 1030   Activity/Group Checklist   Group Life Skills  (Therapeutic Play)   Attendance Attended   Attendance Duration (min) 16-30   Interactions Interacted appropriately   Affect/Mood Appropriate

## 2019-08-16 NOTE — DISCHARGE INSTRUCTIONS
Bipolar Disorder   WHAT YOU NEED TO KNOW:   Bipolar disorder is a long-term chemical imbalance that causes rapid changes in mood and behavior  High moods are called lb  Low moods are called depression  Sometimes you will feel manic and sometimes you will feel depressed  You can have alternating episodes of lb and depression  This is called a mixed bipolar state  DISCHARGE INSTRUCTIONS:   Call 911 if:   · You think about hurting yourself or someone else  Contact your healthcare provider or psychiatrist if:   · You are having trouble managing your bipolar disorder  · You cannot sleep, or are sleeping all the time  · You cannot eat, or are eating more than usual     · You feel dizzy or your stomach is upset  · You cannot make it to your next meeting  · You have questions or concerns about your condition or care  Medicines:   · Medicines  may be given to help keep your mood stable, or to help you sleep  Changes in medicine are often needed as your bipolar disorder changes  · Take your medicine as directed  Contact your healthcare provider if you think your medicine is not helping or if you have side effects  Tell him or her if you are allergic to any medicine  Keep a list of the medicines, vitamins, and herbs you take  Include the amounts, and when and why you take them  Bring the list or the pill bottles to follow-up visits  Carry your medicine list with you in case of an emergency  Follow up with your healthcare provider or psychiatrist as directed:  Write down your questions so you remember to ask them during your visits  Manage bipolar disorder:  Watch for triggers of bipolar disorder symptoms, such as stress  Learn new ways to relax, such as deep breathing, to manage your stress  Tell someone if you feel a manic or depressive period might be coming on  Ask a friend or family member to help watch you for bipolar symptoms   Work to develop skills that will help you manage bipolar disorder  You may need to make lifestyle changes  Ask your healthcare provider or psychiatrist for resources  For support and more information:   · [address] [facility]), [address] [facility], [address] [facility], [address]  [place] [address]   Phone: [phone]  Phone: [phone]  Web Address: Robert tn  · Depression and [address] ([employer])  [address] [place] [address], [facility] , [address]  Phone: [phone]  Web Address: [employer] no  org   © 2017 [address] [facility] Information is for End User's use only and may not be sold, redistributed or otherwise used for commercial purposes  All illustrations and images included in CareNotes® are the copyrighted property of [employer] M [employer] Inc  or Luca Gardner  The above information is an  only  It is not intended as medical advice for individual conditions or treatments  Talk to your doctor, nurse or pharmacist before following any medical regimen to see if it is safe and effective for you  Lithium Toxicity   WHAT YOU NEED TO KNOW:   What is lithium toxicity? Lithium toxicity happens when the amount of lithium in your blood is too high  Lithium is a medicine that is used to treat depression and bipolar disorder  What causes lithium toxicity? Toxicity can occur if you take a large dose of lithium at one time  Toxicity can also be caused by taking a slightly higher dose of lithium over time  It can also occur if you are dehydrated, or you take medicines that cause lithium to build up in your blood  These medicines may include NSAIDs, certain blood pressure medicines, and medicines used to treat epilepsy  A decreased intake of sodium (salt) can also lead to lithium toxicity  What are the signs and symptoms of mild to moderate lithium toxicity?    · Nausea and vomiting     · Abdominal pain or diarrhea    · Shakiness, especially in your hands    · Muscle weakness    · Lack of coordination of fingers, hands, arms, legs, or body    · Confusion    · Drowsiness    · Slurred speech    · Increased thirst  What are the signs and symptoms of severe lithium toxicity? · Blurred vision    · Severe muscle spasms    · Seizures    · Coma  How do I safely take lithium? · Take this medicine exactly as directed  Contact your healthcare provider if you miss a dose or you have any questions about how to take lithium  · Drink liquids as directed  Ask how much liquid to drink each day and which liquids are best for you  Dehydration can increase your risk of lithium toxicity  · Do not decrease the amount of salt you eat without talking to your healthcare provider  A decreased salt intake can increase your risk of lithium toxicity  · Go to all your follow-up appointments  Your healthcare provider will need to monitor you closely while you are taking lithium  You will need regular blood tests  What should I do if I think I or someone I know took too much lithium? Call the [facility] at [phone] immediately  Call 911 or have someone else call for any of the following:   · You have a seizure  · You cannot be awakened  When should I seek immediate care? · You are confused  · You are having trouble staying awake  · You have signs of dehydration such as increased thirst, dark yellow urine, urinating little or not at all, or dry eyes or mouth  · You have severe muscle spasms  When should I contact my healthcare provider? · You have nausea, vomiting, abdominal pain, or diarrhea  · You are shaky  · Your muscles feel weak  · You have questions or concerns about your condition or care  CARE AGREEMENT:   You have the right to help plan your care  Learn about your health condition and how it may be treated   Discuss treatment options with your caregivers to decide what care you want to receive  You always have the right to refuse treatment  The above information is an  only  It is not intended as medical advice for individual conditions or treatments  Talk to your doctor, nurse or pharmacist before following any medical regimen to see if it is safe and effective for you  © 2017 [address] [employer] Information is for End User's use only and may not be sold, redistributed or otherwise used for commercial purposes  All illustrations and images included in CareNotes® are the copyrighted property of [employer]  or [employer]  Lithium (By mouth)   Lithium (LITH-ee-um)  Treats and helps prevent manic episodes of bipolar disorder  Brand Name(s): Lith-Yareli, Lithate, Lithobid   There may be other brand names for this medicine  When This Medicine Should Not Be Used: This medicine is not right for everyone  Do not use it if you had an allergic reaction to lithium, or if you are pregnant or breastfeeding  Do not give the extended-release tablets to anyone younger than 15years of age  How to Use This Medicine:   Capsule, Liquid, Tablet, Long Acting Tablet  · Take your medicine as directed  Your dose may need to be changed several times to find what works best for you  · There are several different forms of lithium  The dose for each is different and they are used at different times of the day  Do not change the type of medicine you take without talking to your doctor first   · Oral liquid: Measure the oral liquid medicine with a marked measuring spoon, oral syringe, or medicine cup  · Capsule, tablet, or extended-release tablet: Swallow the medicine whole  Do not crush, break, or chew it  · Use only the brand of medicine your doctor prescribed  Other brands may not work the same way  · Missed dose: Take a dose as soon as you remember  If it is almost time for your next dose, wait until then and take a regular dose   Do not take extra medicine to make up for a missed dose  · Store the medicine in a closed container at room temperature, away from heat, moisture, and direct light  Drugs and Foods to Avoid:   Ask your doctor or pharmacist before using any other medicine, including over-the-counter medicines, vitamins, and herbal products  · Some foods and medicines can affect how this medicine works  Tell your doctor if you are using any of the following:  ¨ Acetazolamide, carbamazepine, fluoxetine, methyldopa, metronidazole, phenytoin, potassium iodide, sodium bicarbonate, theophylline  ¨ Antacid  ¨ Blood pressure medicine  ¨ Diuretic (water pill)  ¨ Medicine for depression (including paroxetine)  ¨ Medicine to treat mental illness (including haloperidol)  ¨ NSAID pain or arthritis medicine (including celecoxib, ibuprofen, indomethacin, naproxen, piroxicam)  Warnings While Using This Medicine:   · It is not safe to take this medicine during pregnancy  It could harm an unborn baby  Tell your doctor right away if you become pregnant  · Tell your doctor if you have kidney problems, heart or blood vessel disease (including Brugada syndrome), brain or nerve problems, or thyroid problems  · This medicine may cause the following problems:  ¨ Lithium toxicity  ¨ Heart problems  ¨ Kidney problems  ¨ Encephalopathic syndrome (brain problem)  · Make sure any doctor or dentist who treats you knows that you are using this medicine  · This medicine may make you dizzy or drowsy  Do not drive or do anything else that could be dangerous until you know how this medicine affects you  · Your doctor will do lab tests at regular visits to check on the effects of this medicine  Keep all appointments  · Keep all medicine out of the reach of children  Never share your medicine with anyone    Possible Side Effects While Using This Medicine:   Call your doctor right away if you notice any of these side effects:  · Allergic reaction: Itching or hives, swelling in your face or hands, swelling or tingling in your mouth or throat, chest tightness, trouble breathing  · Change in how much or how often you urinate  · Confusion, problems with walking or balance, muscle movements you cannot control  · Diarrhea, vomiting, tremors, or drowsiness  · Fast, pounding, or uneven heartbeat  · Fever  · Lightheadedness or fainting  · Unusual tiredness or weakness  If you notice these less serious side effects, talk with your doctor:   · Mild nausea  · Mild thirst  If you notice other side effects that you think are caused by this medicine, tell your doctor  Call your doctor for medical advice about side effects  You may report side effects to FDA at 6-135-FDA-0942  © 2017 [address]  Information is for End User's use only and may not be sold, redistributed or otherwise used for commercial purposes  The above information is an  only  It is not intended as medical advice for individual conditions or treatments  Talk to your doctor, nurse or pharmacist before following any medical regimen to see if it is safe and effective for you  Serum lithium measurement   GENERAL INFORMATION:  What is this test?  This test measures the amount of lithium in blood  It is used to monitor treatment with lithium, or evaluate for a toxic level of lithium in the body  What are other names for this test?  · Serum lithium level  Why do I need this test?  Laboratory tests may be done for many reasons  Tests are performed for routine health screenings or if a disease or toxicity is suspected  Lab tests may be used to determine if a medical condition is improving or worsening  Lab tests may also be used to measure the success or failure of a medication or treatment plan  Lab tests may be ordered for professional or legal reasons   You may need this test if you have:   · Lithium monitoring  · Lithium poisoning  When and how often should I have this test?  When and how often laboratory tests are done may depend on many factors  The timing of laboratory tests may rely on the results or completion of other tests, procedures, or treatments  Lab tests may be performed immediately in an emergency, or tests may be delayed as a condition is treated or monitored  A test may be suggested or become necessary when certain signs or symptoms appear  Due to changes in the way your body naturally functions through the course of a day, lab tests may need to be performed at a certain time of day  If you have prepared for a test by changing your food or fluid intake, lab tests may be timed in accordance with those changes  Timing of tests may be based on increased and decreased levels of medications, drugs or other substances in the body  The age or gender of the person being tested may affect when and how often a lab test is required  Chronic or progressive conditions may need ongoing monitoring through the use of lab tests  Conditions that worsen and improve may also need frequent monitoring  Certain tests may be repeated to obtain a series of results, or tests may need to be repeated to confirm or disprove results  Timing and frequency of lab tests may vary if they are performed for professional or legal reasons  When this test is used to monitor treatment with lithium, the test is performed 12 hours after the last dose is taken  This test should be done at least every 6 months after a patient has become stable on lithium  How should I get ready for the test?  Before having blood collected, tell the person drawing your blood if you are allergic to latex  Tell the healthcare worker if you have a medical condition or are using a medication or supplement that causes excessive bleeding  Also tell the healthcare worker if you have felt nauseated, lightheaded, or have fainted while having blood drawn in the past   How is the test done?   The amount of discomfort you feel will depend on many factors, including your sensitivity to pain  Communicate how you are feeling with the person doing the test  Inform the person doing the test if you feel that you cannot continue with the test   When a blood sample from a vein is needed, a vein in your arm is usually selected  A tourniquet (large rubber strap) may be secured above the vein  The skin over the vein will be cleaned, and a needle will be inserted  You will be asked to hold very still while your blood is collected  Blood will be collected into one or more tubes, and the tourniquet will be removed  When enough blood has been collected, the healthcare worker will take the needle out  How will the test feel? The amount of discomfort you feel will depend on many factors, including your sensitivity to pain  Communicate how you are feeling with the person doing the test  Inform the person doing the test if you feel that you cannot continue with the test   During a blood draw, you may feel mild discomfort at the location where the blood sample is being collected  What should I do after the test?  After a blood sample is collected from your vein, a bandage, cotton ball, or gauze may be placed on the area where the needle was inserted  You may be asked to apply pressure to the area  Avoid strenuous exercise immediately after your blood draw  Contact your healthcare worker if you feel pain or see redness, swelling, or discharge from the puncture site  What are the risks? Blood: During a blood draw, a hematoma (blood-filled bump under the skin) or slight bleeding from the puncture site may occur  After a blood draw, a bruise or infection may occur at the puncture site  The person doing this test may need to perform it more than once   Talk to your healthcare worker if you have any concerns about the risks of this test   What are normal results for this test?  Laboratory test results may vary depending on your age, gender, health history, the method used for the test, and many other factors  If your results are different from the results suggested below, this may not mean that you have a disease  Contact your healthcare worker if you have any questions  The following is considered to be a normal result for this test:  · Therapeutic range: 0 6 mEq/L-1 2 mEq/L (0 6 mmols/L-1 2 mmols/L)  What follow up should I do after this test?  Ask your healthcare worker how you will be informed of the test results  You may be asked to call for results, schedule an appointment to discuss results, or notified of results by mail  Follow up care varies depending on many factors related to your test  Sometimes there is no follow up after you have been notified of test results  At other times follow up may be suggested or necessary  Some examples of follow up care include changes to medication or treatment plans, referral to a specialist, more or less frequent monitoring, and additional tests or procedures  Talk with your healthcare worker about any concerns or questions you have regarding follow up care or instructions  Where can I get more information? Related Companies   ? [address] ([address]), Public Information & Communication Branch - [employer]  Related Drugs   ? Lithium    CARE AGREEMENT:   You have the right to help plan your care  To help with this plan, you must learn about your health condition and how it may be treated  You can then discuss treatment options with your caregivers  Work with them to decide what care may be used to treat you  You always have the right to refuse treatment  © Copyright [employer] 2018  Information is for End User's use only and may not be sold, redistributed or otherwise used for commercial purposes  The above information is an  only  It is not intended as a substitute for medical advice for your individual conditions or treatments   Talk to your doctor, nurse or pharmacist before following any medical regimen to see if it is safe and effective for you  Divalproex (By mouth)   Divalproex Sodium (dye-ALLISON-proe-ex NAIN-kyung-um)  Treats seizures  Also treats bipolar disorder and helps prevent migraine headaches  Brand Name(s): Depakote, Depakote ER, Depakote Sprinkles   There may be other brand names for this medicine  When This Medicine Should Not Be Used: This medicine is not right for everyone  Do not use it if you had an allergic reaction to divalproex, valproate sodium, valproic acid, if you are pregnant, or if you have certain genetic disorders (such as urea cycle disorder or mitochondrial disorders)  How to Use This Medicine:   Delayed Release Capsule, Delayed Release Tablet, Coated Tablet, Long Acting Tablet  · Take your medicine as directed  Your dose may need to be changed several times to find what works best for you  · You may take this medicine with food to decrease stomach upset  · Capsule, tablet, or extended-release tablet: Swallow the medicine whole  Do not crush, break, or chew it  · Sprinkle capsule: You may open the capsule and pour the medicine into a small amount of soft food such as pudding or applesauce  Stir this mixture well and swallow it without chewing  · This medicine should come with a Medication Guide  Ask your pharmacist for a copy if you do not have one  · Missed dose: Take a dose as soon as you remember  If it is almost time for your next dose, wait until then and take a regular dose  Do not take extra medicine to make up for a missed dose  If you miss 2 or more doses, call your doctor  · Store the medicine in a closed container at room temperature, away from heat, moisture, and direct light  Drugs and Foods to Avoid:   Ask your doctor or pharmacist before using any other medicine, including over-the-counter medicines, vitamins, and herbal products  · Some medicines can affect how divalproex sodium works   Tell your doctor if you are using any of the following: ¨ Amitriptyline, aspirin, clonazepam, diazepam, nortriptyline, propofol, rifampin, ritonavir, rufinamide, tolbutamide, or zidovudine  ¨ Birth control pill  ¨ Blood thinner (including warfarin)  ¨ Carbapenem antibiotic (including ertapenem, imipenem, meropenem)  ¨ Other seizure medicines (including carbamazepine, ethosuximide, felbamate, lamotrigine, phenobarbital, phenytoin, primidone, topiramate)  · Alcohol, narcotic pain relievers, or sleeping pills may cause you to feel more lightheaded, dizzy, or faint when used with this medicine  Warnings While Using This Medicine:   · It is not safe to take this medicine during pregnancy  It could harm an unborn baby  Tell your doctor right away if you become pregnant  · Tell your doctor if you are breastfeeding, or if you have kidney disease, liver disease, a blood disease, or pancreas problems, or a history of depression or mental health problems  · This medicine may cause the following problems:  ¨ Liver problems  ¨ Pancreatitis  ¨ Hyperammonemic encephalopathy (too much ammonia in your blood)  ¨ Depression or thoughts of suicide  ¨ Thrombocytopenia (decrease in blood cells that affect clotting)  ¨ Hypothermia (low body temperature)  ¨ Drug reaction with eosinophilia and systemic symptoms (DRESS), which may damage organs such as the liver, kidney, or heart  · This medicine may make you dizzy or drowsy  Do not drive or do anything that could be dangerous until you know how this medicine affects you  · Do not stop using this medicine suddenly  Your doctor will need to slowly decrease your dose before you stop it completely  · Tell any doctor or dentist who treats you that you are using this medicine  This medicine may affect certain medical test results  · Your doctor will check your progress and the effects of this medicine at regular visits  Keep all appointments  · Keep all medicine out of the reach of children  Never share your medicine with anyone    Possible Side Effects While Using This Medicine:   Call your doctor right away if you notice any of these side effects:  · Allergic reaction: Itching or hives, swelling in your face or hands, swelling or tingling in your mouth or throat, chest tightness, trouble breathing  · Blistering, peeling, red skin rash  · Confusion, problems with memory, unusual drowsiness, clumsiness  · Dark urine or pale stools, loss of appetite, stomach pain, yellow skin or eyes  · Fever, rash, swollen glands in the neck, armpit, or groin  · Sudden and severe stomach pain, nausea, vomiting, lightheadedness  · Thoughts of hurting yourself, depression, unusual changes in behavior or moods  · Unusual bleeding, bruising, or weakness  If you notice these less serious side effects, talk with your doctor:   · Diarrhea, stomach upset  · Hair loss  · Tiredness, sleepiness  · Trouble sleeping, tremor  · Vision changes, dizziness, headache  If you notice other side effects that you think are caused by this medicine, tell your doctor  Call your doctor for medical advice about side effects  You may report side effects to FDA at 9-268-FDA-2991  © 2017 [address] [employer] Information is for End User's use only and may not be sold, redistributed or otherwise used for commercial purposes  The above information is an  only  It is not intended as medical advice for individual conditions or treatments  Talk to your doctor, nurse or pharmacist before following any medical regimen to see if it is safe and effective for you

## 2019-08-16 NOTE — CASE MANAGEMENT
Called pt's daughter, Júnior Arceo, [phone]  Asked if she or her brother was picking pt up today @ D/C   Left message @ 11 AM

## 2019-08-16 NOTE — PROGRESS NOTES
08/15/19 1415   Activity/Group Checklist   Group Other (Comment)  (Art Therapy Process Group/Open Choice, Discussion)   Attendance Attended   Attendance Duration (min) Greater than 60   Interactions Interacted appropriately   Affect/Mood Appropriate   Goals Achieved Able to listen to others; Able to engage in interactions; Able to recieve feedback; Able to give feedback to another  (Able to engage art materials; full participation)

## 2019-08-17 NOTE — OP NOTE
Procedure Note - ECT  Albert Castellanos 38 y o  female MRN: [id number]    Time out was taken with staff to confirm correct patient and correct procedure to be performed  Pt was anxious, less depressed  Restricted affect  Diagnosis: MDD    ECT Type: Inpatient, Acute    Anesthesia: Brevital    Electrode Placement: right unilateral    Energy level: 50%      Seizure Duration     EE sec  EM sec     Results: Good brain response to stimulation  No longer headaches or nausea  Tolerated procedure well  Some short-lived postictal emotional lability, resolved in 5 minutes

## 2019-08-19 ENCOUNTER — TRANSITIONAL CARE MANAGEMENT (OUTPATIENT)
Dept: INTERNAL MEDICINE CLINIC | Facility: CLINIC | Age: 44
End: 2019-08-19

## 2019-08-19 ENCOUNTER — ANESTHESIA EVENT (OUTPATIENT)
Dept: PREOP/PACU | Facility: HOSPITAL | Age: 44
End: 2019-08-19

## 2019-08-20 ENCOUNTER — HOSPITAL ENCOUNTER (OUTPATIENT)
Dept: PREOP/PACU | Facility: HOSPITAL | Age: 44
Setting detail: OUTPATIENT SURGERY
Discharge: HOME | End: 2019-08-20
Payer: COMMERCIAL

## 2019-08-20 ENCOUNTER — ANESTHESIA (OUTPATIENT)
Dept: PREOP/PACU | Facility: HOSPITAL | Age: 44
End: 2019-08-20

## 2019-08-20 VITALS
SYSTOLIC BLOOD PRESSURE: 110 MMHG | HEIGHT: 63 IN | DIASTOLIC BLOOD PRESSURE: 53 MMHG | HEART RATE: 95 BPM | WEIGHT: 151 LBS | RESPIRATION RATE: 18 BRPM | OXYGEN SATURATION: 99 % | BODY MASS INDEX: 26.75 KG/M2 | TEMPERATURE: 97.6 F

## 2019-08-20 PROCEDURE — 90870 ELECTROCONVULSIVE THERAPY: CPT

## 2019-08-20 RX ORDER — ETOMIDATE 2 MG/ML
INJECTION INTRAVENOUS AS NEEDED
Status: DISCONTINUED | OUTPATIENT
Start: 2019-08-20 | End: 2019-08-20 | Stop reason: SURG

## 2019-08-20 RX ORDER — GLYCOPYRROLATE 0.2 MG/ML
INJECTION INTRAMUSCULAR; INTRAVENOUS AS NEEDED
Status: DISCONTINUED | OUTPATIENT
Start: 2019-08-20 | End: 2019-08-20 | Stop reason: SURG

## 2019-08-20 RX ORDER — ESMOLOL HYDROCHLORIDE 10 MG/ML
INJECTION INTRAVENOUS AS NEEDED
Status: DISCONTINUED | OUTPATIENT
Start: 2019-08-20 | End: 2019-08-20 | Stop reason: SURG

## 2019-08-20 RX ORDER — SUCCINYLCHOLINE/SOD CL,ISO/PF 100 MG/5ML
SYRINGE (ML) INTRAVENOUS AS NEEDED
Status: DISCONTINUED | OUTPATIENT
Start: 2019-08-20 | End: 2019-08-20 | Stop reason: SURG

## 2019-08-20 RX ORDER — SODIUM CHLORIDE 9 MG/ML
50 INJECTION, SOLUTION INTRAVENOUS CONTINUOUS
Status: DISCONTINUED | OUTPATIENT
Start: 2019-08-20 | End: 2019-08-24 | Stop reason: HOSPADM

## 2019-08-20 RX ORDER — ONDANSETRON 2 MG/ML
INJECTION INTRAMUSCULAR; INTRAVENOUS AS NEEDED
Status: DISCONTINUED | OUTPATIENT
Start: 2019-08-20 | End: 2019-08-20 | Stop reason: SURG

## 2019-08-20 RX ADMIN — ETOMIDATE 12 MG: 2 INJECTION, SOLUTION INTRAVENOUS at 06:59

## 2019-08-20 RX ADMIN — Medication 80 MG: at 06:59

## 2019-08-20 RX ADMIN — GLYCOPYRROLATE 0.2 MG: 0.2 INJECTION, SOLUTION INTRAMUSCULAR; INTRAVENOUS at 06:54

## 2019-08-20 RX ADMIN — ESMOLOL HYDROCHLORIDE 50 MG: 10 INJECTION, SOLUTION INTRAVENOUS at 07:01

## 2019-08-20 RX ADMIN — SODIUM CHLORIDE 50 ML/HR: 0.9 INJECTION, SOLUTION INTRAVENOUS at 05:24

## 2019-08-20 RX ADMIN — ONDANSETRON HYDROCHLORIDE 4 MG: 2 INJECTION, SOLUTION INTRAMUSCULAR; INTRAVENOUS at 06:54

## 2019-08-20 NOTE — OP NOTE
Procedure Note - ECT  Tomasa Saenz 38 y o  female MRN: [id number]    Time out was taken with staff to confirm correct patient and correct procedure to be performed  After transitioning to outpatient care the patient did not feel rebound depression  She was moderately anxious  One of her brother, who lives in [place] passed away on weekend  Pt is going through a process of grieving  Feeling depressed but better than prior to treatment  No SI  Session Number: 006    Diagnosis: MDD    ECT Type: Outpatient, Acute    Anesthesia: Brevital Etomidate    Electrode Placement: right unilateral    Energy level: 50%      Seizure Duration     EE sec, pt has an excellent bilateral high amplitude ECT response with follow up post-ictal suppression  EM sec    Results:Clinical seizure was satisfactory, Patient tolerated ECT well, Complications: none  Some emotional post-ictal vocalizations, less than the last time  Plan: will continue the ECT  Patient's depression is improving despite a new significant stress of death of her brother

## 2019-08-20 NOTE — DISCHARGE INSTRUCTIONS
Electroconvulsive Therapy   WHAT YOU NEED TO KNOW:   Electroconvulsive therapy (ECT) is a treatment that sends a small electric current to your brain to cause a seizure  The seizure affects the chemicals in your brain, which may make your brain cells work better  ECT is used to treat certain conditions, such as depression, that do not get better after medicines or other therapies have been tried  DISCHARGE INSTRUCTIONS:   Medicines:   · Antidepressants: You may be given this medicine to help decrease or prevent symptoms of depression  · Take your medicine as directed  Contact your healthcare provider if you think your medicine is not helping or if you have side effects  Tell him of her if you are allergic to any medicine  Keep a list of the medicines, vitamins, and herbs you take  Include the amounts, and when and why you take them  Bring the list or the pill bottles to follow-up visits  Carry your medicine list with you in case of an emergency  Follow up with your healthcare provider as directed:  Write down your questions so you remember to ask them during your visits  Memory loss and headaches:   · ECT may cause memory loss and confusion  Your confusion may go away in a short time, such as 1 hour after your treatment  You may lose your memory for 1 to 3 weeks, and some memories may be lost forever  · You may also get a headache after an ECT treatment  These headaches usually only last a short time  If you have a headache after ECT, ask your healthcare provider for medicine to make it go away  If more ECT treatments are planned for you, ask the healthcare provider to give you medicine before the treatments to help prevent headaches  · There is a greater chance that you will fall after ECT treatments  Ask someone to help you when you want to stand up or walk  Ask your healthcare provider for more information on how to prevent falls    Contact your healthcare provider if:   · You have severe pain in your back or neck  · You have questions or concerns about your condition or care  Return to the emergency department if:   · You have a fever  · You have a severe headache that does not get better, even after you take medicine to treat it  · You have a stiff neck or trouble thinking clearly  · You have feelings of guilt or hopelessness, or thoughts of hurting or killing yourself or others  · You have shortness of breath, chest pain, or a fast heartbeat  © 2017 [employer] Information is for End User's use only and may not be sold, redistributed or otherwise used for commercial purposes  All illustrations and images included in CareNotes® are the copyrighted property of A [employer] M [employer]  or Luca Gardner  The above information is an  only  It is not intended as medical advice for individual conditions or treatments  Talk to your doctor, nurse or pharmacist before following any medical regimen to see if it is safe and effective for you

## 2019-08-20 NOTE — ANESTHESIA PREPROCEDURE EVALUATION
Review of Systems/Medical History  Patient summary reviewed  Chart reviewed  No history of anesthetic complications     Cardiovascular  Negative cardio ROS Hyperlipidemia,    Pulmonary  Smoker cigar smoker  , Tobacco cessation counseling given Cumulative Pack Years: 20, COPD mild- PRN medication , No sleep apnea ,        GI/Hepatic  Negative GI/hepatic ROS   Liver disease , alcohol related,        Negative  ROS        Endo/Other     GYN    Hysterectomy,        Hematology  Anemia iron deficiency anemia,     Musculoskeletal       Neurology  Negative neurology ROS      Psychology   Depression , being treated for depression,     Comment: ETOH abuse         Physical Exam    Airway    Mallampati score: II  TM Distance: >3 FB  Neck ROM: full     Dental       Cardiovascular  Comment: Negative ROS, Cardiovascular exam normal    Pulmonary  Pulmonary exam normal     Other Findings        Anesthesia Plan  ASA Score- 2     Anesthesia Type- general with ASA Monitors  Additional Monitors:   Airway Plan:     Comment: Chart reviewed and pt states no new changes in health  No prior ECT issues  No questions voiced  Pt re consents to ECT 8/20  Plan Factors-Patient not instructed to abstain from smoking on day of procedure  Patient did not smoke on day of surgery  Induction- intravenous  Postoperative Plan-     Informed Consent- Anesthetic plan and risks discussed with patient  I personally reviewed this patient with the CRNA  Discussed and agreed on the Anesthesia Plan with the CRNA  Popeye Garces             No results found for: HGBA1C    Lab Results   Component Value Date    K 4 2 08/06/2019     08/06/2019    CO2 27 08/06/2019    BUN 11 08/06/2019    CREATININE 0 74 08/06/2019    GLUF 76 08/06/2019    CALCIUM 8 7 08/06/2019    AST 15 08/06/2019    ALT 9 08/06/2019    ALKPHOS 33 (L) 08/06/2019    EGFR 98 08/06/2019       Lab Results   Component Value Date    WBC 5 40 08/06/2019    HGB 12 1 08/06/2019    HCT 36 6 08/06/2019 MCV 96 08/06/2019     08/06/2019   Normal sinus rhythm  Nonspecific ST and T wave abnormality  Abnormal ECG  When compared with ECG of 09-AUG-2019 16:10,  Questionable change in QRS axis  Nonspecific T wave abnormality, worse in Lateral leads  QT has shortened  Confirmed by Camila Brooks ([id number]) on 8/13/2019 8:30:34 AM

## 2019-08-20 NOTE — ANESTHESIA POSTPROCEDURE EVALUATION
Post-Op Assessment Note    CV Status:  Stable  Pain Score: 0    Pain management: adequate     Mental Status:  Alert and awake   Hydration Status:  Euvolemic   PONV Controlled:  Controlled   Airway Patency:  Patent   Post Op Vitals Reviewed: Yes      Staff: CRNA           BP   134/76   Temp     Pulse  105   Resp   20   SpO2   100

## 2019-08-21 ENCOUNTER — ANESTHESIA EVENT (OUTPATIENT)
Dept: PREOP/PACU | Facility: HOSPITAL | Age: 44
End: 2019-08-21

## 2019-08-21 ENCOUNTER — OFFICE VISIT (OUTPATIENT)
Dept: INTERNAL MEDICINE CLINIC | Facility: CLINIC | Age: 44
End: 2019-08-21
Payer: COMMERCIAL

## 2019-08-21 VITALS
OXYGEN SATURATION: 98 % | BODY MASS INDEX: 27.93 KG/M2 | TEMPERATURE: 98.4 F | HEART RATE: 99 BPM | WEIGHT: 157.6 LBS | SYSTOLIC BLOOD PRESSURE: 100 MMHG | DIASTOLIC BLOOD PRESSURE: 70 MMHG | HEIGHT: 63 IN

## 2019-08-21 DIAGNOSIS — R45.851 SUICIDAL IDEATIONS: ICD-10-CM

## 2019-08-21 DIAGNOSIS — E55.9 VITAMIN D DEFICIENCY: Primary | ICD-10-CM

## 2019-08-21 DIAGNOSIS — D50.9 IRON DEFICIENCY ANEMIA, UNSPECIFIED IRON DEFICIENCY ANEMIA TYPE: ICD-10-CM

## 2019-08-21 DIAGNOSIS — F31.5 BIPOLAR I DISORDER, SEVERE, CURRENT OR MOST RECENT EPISODE DEPRESSED, WITH PSYCHOTIC FEATURES (HCC): ICD-10-CM

## 2019-08-21 PROBLEM — R79.89 LOW VITAMIN D LEVEL: Status: RESOLVED | Noted: 2017-01-17 | Resolved: 2019-08-21

## 2019-08-21 PROCEDURE — 99496 TRANSJ CARE MGMT HIGH F2F 7D: CPT | Performed by: NURSE PRACTITIONER

## 2019-08-21 NOTE — ASSESSMENT & PLAN NOTE
Patient denies suicidal ideations at this time, patient is currently following psychiatry, and is receiving ECT treatments

## 2019-08-21 NOTE — ANESTHESIA PREPROCEDURE EVALUATION
Review of Systems/Medical History  Patient summary reviewed  Chart reviewed  No history of anesthetic complications     Cardiovascular  Negative cardio ROS Exercise tolerance (METS): >4,  Hyperlipidemia,    Pulmonary  Smoker ex-smoker  Cumulative Pack Years: 13, No pneumonia, No COPD , No asthma , No sleep apnea ,        GI/Hepatic  Negative GI/hepatic ROS          Negative  ROS        Endo/Other  Negative endo/other ROS      GYN    Hysterectomy,        Hematology  Anemia (no longer anemic) ,     Musculoskeletal  Negative musculoskeletal ROS        Neurology  Negative neurology ROS      Psychology   Depression , bipolar disorder and being treated for depression,     Comment: Hx of ETOH abuse         Physical Exam    Airway    Mallampati score: II  TM Distance: >3 FB  Neck ROM: full     Dental       Cardiovascular  Comment: Negative ROS, Cardiovascular exam normal    Pulmonary  Pulmonary exam normal     Other Findings  Fixed upper and lower teeth and in good repair      Anesthesia Plan  ASA Score- 2     Anesthesia Type- general with ASA Monitors  Additional Monitors:   Airway Plan:     Comment: Chart reviewed and pt states no new changes in health  No prior ECT issues  No questions voiced  Pt re consents to ECT  Old charts reviewed  Pt known to me  Plan Factors-Patient not instructed to abstain from smoking on day of procedure  Patient did not smoke on day of surgery  Induction- intravenous  Postoperative Plan-     Informed Consent- Anesthetic plan and risks discussed with patient  I personally reviewed this patient with the CRNA  Discussed and agreed on the Anesthesia Plan with the CRNA  Megan Aiken

## 2019-08-21 NOTE — PROGRESS NOTES
Assessment/Plan:    Vitamin D deficiency  Advised patient that she should restart taking her vitamin-D supplementation  Suicidal ideations  Patient denies suicidal ideations at this time, patient is currently following psychiatry, and is receiving ECT treatments  Bipolar I disorder, severe, current or most recent episode depressed, with psychotic features [facility])  Patient will continue with lithium as well as Depakote and Klonopin  Patient's medications will be managed by Psychiatry  Patient has follow-up appointment tomorrow at which point it will be determined if she may return to work or not  Patient states she has been taking medications as instructed  Anemia, iron deficiency  Did advise patient to continue with iron supplementation as well as vitamin-C   BMI Counseling: Body mass index is 27 92 kg/m²  Discussed the patient's BMI with her  The BMI is above average  BMI counseling and education was provided to the patient  Nutrition recommendations include reducing portion sizes, decreasing overall calorie intake, 3-5 servings of fruits/vegetables daily, reducing fast food intake, consuming healthier snacks, decreasing soda and/or juice intake, moderation in carbohydrate intake, increasing intake of lean protein, reducing intake of saturated fat and trans fat and reducing intake of cholesterol  Exercise recommendations include exercising 3-5 times per week  Diagnoses and all orders for this visit:    Vitamin D deficiency    Suicidal ideations    Bipolar I disorder, severe, current or most recent episode depressed, with psychotic features ([address] )    Iron deficiency anemia, unspecified iron deficiency anemia type          Subjective:      Patient ID: Broderick Lema is a 38 y o  female  Patient was [facility] to [facility] from 8/6 to 8/16 for bipolar depression    Hospital course as per Dr Jt Montes De Oca as stated below:    "Sandie Lott was admitted to the inpatient psychiatric unit and started on Behavioral Health checks every 7 minutes  During the hospitalization she was Behavioral Health checks every 7 minutes      Psychiatric medications were restarted during the hospital stay  To address depressive symptoms, Consuelo Ryan was treated with mood stabilizer Depakote and Lithium  Medication doses were continued during the hospital course  On that dose Lithium level was therapeutic at 1 1 on 8/10/2019 and VPA was 62 9 on the same day  Prior to beginning of treatment medications risks and benefits and possible side effects including risk of kidney impairment related to treatment with Lithium, risk of liver impairment related to treatment with Depakote, risks of misuse, abuse or dependence, sedation and respiratory depression related to treatment with benzodiazepine medications, risks and benefits of treatment with medications in pregnancy and risks and benefits of treatment with medications in lactation were discussed with Consuelo Ryan  She verbalized understanding and agreement for treatment  Upon admission she was seen by medical service for medical clearance for inpatient treatment and medical follow up      Sumas symptoms slowly improved over the hospital course  Initially after admission she was still feeling depressed  With adjustment of medications and therapeutic milieu her symptoms gradually improved  At the end of treatment Consuelo Ryan was doing much better  Her mood was significantly improved at the time of discharge  She denied suicidal ideation, intent or plan at the time of discharge and denied homicidal ideation, intent or plan at the time of discharge  There was no overt psychosis at the time of discharge  Auditory hallucinations were resolved  She was participating appropriately in milieu at the time of discharge  Behavior was appropriate on the unit at the time of discharge  Sleep and appetite were improved   She was tolerating medications and was not reporting any significant side effects at the time of discharge      Since she was doing well at the end of the hospitalization, treatment team felt that she could be safely discharged to outpatient care  Prior to discharge  spoke with her's daughter to address support and her readiness for discharge      The outpatient follow up at [address] [place] [address] was arranged by the unit  upon discharge "    Over all patient is feeling better for discharge, patient denies suicidal ideations, denies hallucinations  Patient will be receiving 6 ECT treatemnts  The following portions of the patient's history were reviewed and updated as appropriate: allergies, current medications, past family history, past medical history, past social history, past surgical history and problem list     Review of Systems   Constitutional: Negative for activity change, appetite change, chills, diaphoresis and fever  HENT: Negative for congestion, ear discharge, ear pain, postnasal drip, rhinorrhea, sinus pressure, sinus pain and sore throat  Eyes: Negative for pain, discharge, itching and visual disturbance  Respiratory: Negative for cough, chest tightness, shortness of breath and wheezing  Cardiovascular: Negative for chest pain, palpitations and leg swelling  Gastrointestinal: Negative for abdominal pain, blood in stool, constipation, diarrhea, nausea and vomiting  Endocrine: Negative for polydipsia, polyphagia and polyuria  Genitourinary: Negative for difficulty urinating, dysuria, hematuria and urgency  Musculoskeletal: Negative for arthralgias, back pain and neck pain  Skin: Negative for rash and wound  Neurological: Negative for dizziness, weakness, numbness and headaches           Past Medical History:   Diagnosis Date    Addiction to drug ([facility] )     Alcohol abuse     Alcoholism ([facility] )     Anemia due to vitamin B12 deficiency     last assessed 10/10/17, iron deficiency anemia    Bipolar affective disorder (HCC)     current episode mixed, current episode severity unspecified, last assessed 3/8/17    Depression     Hallucination     Psychosis ([facility] )     Pyelonephritis     Self-injurious behavior     Sleep difficulties     Suicide attempt ([facility] )     Vitamin D deficiency          Current Outpatient Medications:     divalproex sodium (DEPAKOTE) 500 mg EC tablet, Take 1 tablet (500 mg total) by mouth 2 (two) times a day, Disp: 60 tablet, Rfl: 0    lithium carbonate (LITHOBID) 300 mg CR tablet, Take 1 tablet (300 mg total) by mouth daily, Disp: 30 tablet, Rfl: 0    lithium carbonate (LITHOBID) 450 mg CR tablet, Take 1 tablet (450 mg total) by mouth daily at bedtime, Disp: 30 tablet, Rfl: 0    TraZODone & Diet Manage Prod (TRAZAMINE PO), Take 150 mg by mouth, Disp: , Rfl:     clonazePAM (KlonoPIN) 0 5 mg tablet, Take 1 tablet (0 5 mg total) by mouth daily at bedtime for 10 days, Disp: 30 tablet, Rfl: 0  No current facility-administered medications for this visit       Facility-Administered Medications Ordered in Other Visits:     sodium chloride 0 9 % infusion, 50 mL/hr, Intravenous, Continuous, Annel Treviño MD, Last Rate: 50 mL/hr at 08/20/19 0524    Allergies   Allergen Reactions    Lamotrigine Rash and Other (See Comments)     Mini Dark syndrome    Venofer Alexandra Cover Sucrose] Anaphylaxis     IV Venofer    Sulfa Antibiotics Rash       Social History   Past Surgical History:   Procedure Laterality Date    TOTAL ABDOMINAL HYSTERECTOMY  2014    TUBAL LIGATION      WISDOM TOOTH EXTRACTION       Family History   Problem Relation Age of Onset    Hypertension Mother     Depression Mother     Anxiety disorder Mother     Neuropathy Mother     ADD / ADHD Father     Hypertension Father     ADD / ADHD Son     Asthma Son     Asthma Daughter     Anxiety disorder Daughter     Alcohol abuse Sister     No Known Problems Brother     Hypertension Maternal Grandmother     Stroke Maternal Grandmother     Dementia Maternal Grandmother     Stomach cancer Maternal Grandfather     Colon cancer Maternal Grandfather 66    Stroke Paternal Grandfather     Depression Brother     Anxiety disorder Brother     Alcohol abuse Brother     Depression Son     No Known Problems Paternal Grandmother     Depression Brother     Alcohol abuse Brother     No Known Problems Brother     Ovarian cancer Other        Objective:  /70 (BP Location: Left arm, Patient Position: Sitting, Cuff Size: Adult)   Pulse 99   Temp 98 4 °F (36 9 °C) (Oral)   Ht 5' 3" (1 6 m)   Wt 71 5 kg (157 lb 9 6 oz)   LMP 07/24/2014 (LMP Unknown)   SpO2 98%   BMI 27 92 kg/m²     Recent Results (from the past 1344 hour(s))   Valproic acid level, total    Collection Time: 08/03/19 10:05 AM   Result Value Ref Range    Valproic Acid, Total 87 50 - 100 ug/mL   Lithium level    Collection Time: 08/03/19 10:05 AM   Result Value Ref Range    Lithium Lvl 0 9 0 5 - 1 0 mmol/L   CBC and differential    Collection Time: 08/03/19 10:05 AM   Result Value Ref Range    WBC 6 15 4 31 - 10 16 Thousand/uL    RBC 4 09 3 81 - 5 12 Million/uL    Hemoglobin 12 9 11 5 - 15 4 g/dL    Hematocrit 41 5 34 8 - 46 1 %     (H) 82 - 98 fL    MCH 31 5 26 8 - 34 3 pg    MCHC 31 1 (L) 31 4 - 37 4 g/dL    RDW 12 3 11 6 - 15 1 %    MPV 10 2 8 9 - 12 7 fL    Platelets 329 910 - 109 Thousands/uL    nRBC 0 /100 WBCs    Neutrophils Relative 59 43 - 75 %    Immat GRANS % 0 0 - 2 %    Lymphocytes Relative 31 14 - 44 %    Monocytes Relative 6 4 - 12 %    Eosinophils Relative 3 0 - 6 %    Basophils Relative 1 0 - 1 %    Neutrophils Absolute 3 57 1 85 - 7 62 Thousands/µL    Immature Grans Absolute 0 02 0 00 - 0 20 Thousand/uL    Lymphocytes Absolute 1 93 0 60 - 4 47 Thousands/µL    Monocytes Absolute 0 39 0 17 - 1 22 Thousand/µL    Eosinophils Absolute 0 19 0 00 - 0 61 Thousand/µL    Basophils Absolute 0 05 0 00 - 0 10 Thousands/µL   Comprehensive metabolic panel    Collection Time: 08/03/19 10:05 AM   Result Value Ref Range    Sodium 141 136 - 145 mmol/L    Potassium 4 4 3 5 - 5 3 mmol/L    Chloride 108 100 - 108 mmol/L    CO2 28 21 - 32 mmol/L    ANION GAP 5 4 - 13 mmol/L    BUN 7 5 - 25 mg/dL    Creatinine 0 90 0 60 - 1 30 mg/dL    Glucose, Fasting 80 65 - 99 mg/dL    Calcium 8 5 8 3 - 10 1 mg/dL    AST 8 5 - 45 U/L    ALT 11 (L) 12 - 78 U/L    Alkaline Phosphatase 39 (L) 46 - 116 U/L    Total Protein 7 0 6 4 - 8 2 g/dL    Albumin 3 5 3 5 - 5 0 g/dL    Total Bilirubin 0 31 0 20 - 1 00 mg/dL    eGFR 77 ml/min/1 73sq m   Vitamin D 25 hydroxy    Collection Time: 08/03/19 10:05 AM   Result Value Ref Range    Vit D, 25-Hydroxy 39 7 30 0 - 100 0 ng/mL   Lipid Panel with Direct LDL reflex    Collection Time: 08/03/19 10:05 AM   Result Value Ref Range    Cholesterol 175 50 - 200 mg/dL    Triglycerides 83 <=150 mg/dL    HDL, Direct 86 (H) 40 - 60 mg/dL    LDL Calculated 72 0 - 100 mg/dL   Rapid drug screen, urine    Collection Time: 08/05/19  4:45 PM   Result Value Ref Range    Amph/Meth UR Negative Negative    Barbiturate Ur Negative Negative    Benzodiazepine Urine Negative Negative    Cocaine Urine Negative Negative    Methadone Urine Negative Negative    Opiate Urine Negative Negative    PCP Ur Negative Negative    THC Urine Negative Negative   POCT alcohol breath test    Collection Time: 08/05/19  4:47 PM   Result Value Ref Range    EXTBreath Alcohol 0 00    POCT pregnancy, urine    Collection Time: 08/05/19  9:04 PM   Result Value Ref Range    EXT PREG TEST UR (Ref: Negative) negative     Control valid    CBC and differential    Collection Time: 08/06/19  5:47 AM   Result Value Ref Range    WBC 5 40 4 50 - 11 00 Thousand/uL    RBC 3 81 (L) 4 00 - 5 20 Million/uL    Hemoglobin 12 1 12 0 - 16 0 g/dL    Hematocrit 36 6 36 0 - 46 0 %    MCV 96 80 - 100 fL    MCH 31 8 26 0 - 34 0 pg    MCHC 33 2 31 0 - 36 0 g/dL    RDW 12 9 <15 3 %    MPV 8 7 (L) 8 9 - 12 7 fL    Platelets 500 788 - 340 Thousands/uL    Neutrophils Relative 59 45 - 65 %    Lymphocytes Relative 31 25 - 45 %    Monocytes Relative 7 1 - 10 %    Eosinophils Relative 2 0 - 6 %    Basophils Relative 1 0 - 1 %    Neutrophils Absolute 3 20 1 80 - 7 80 Thousands/µL    Lymphocytes Absolute 1 60 0 50 - 4 00 Thousands/µL    Monocytes Absolute 0 40 0 20 - 0 90 Thousand/µL    Eosinophils Absolute 0 10 0 00 - 0 40 Thousand/µL    Basophils Absolute 0 00 0 00 - 0 10 Thousands/µL   Comprehensive metabolic panel    Collection Time: 08/06/19  5:47 AM   Result Value Ref Range    Sodium 139 137 - 147 mmol/L    Potassium 4 2 3 6 - 5 0 mmol/L    Chloride 107 97 - 108 mmol/L    CO2 27 22 - 30 mmol/L    ANION GAP 5 5 - 14 mmol/L    BUN 11 5 - 25 mg/dL    Creatinine 0 74 0 60 - 1 20 mg/dL    Glucose 76 70 - 99 mg/dL    Glucose, Fasting 76 70 - 99 mg/dL    Calcium 8 7 8 4 - 10 2 mg/dL    AST 15 14 - 36 U/L    ALT 9 9 - 52 U/L    Alkaline Phosphatase 33 (L) 43 - 122 U/L    Total Protein 6 0 5 9 - 8 4 g/dL    Albumin 3 5 3 0 - 5 2 g/dL    Total Bilirubin 0 20 <1 30 mg/dL    eGFR 98 >60 ml/min/1 73sq m   TSH, 3rd generation    Collection Time: 08/07/19  6:05 AM   Result Value Ref Range    TSH 3RD GENERATON 1 760 0 465 - 4 680 uIU/mL   T4, free    Collection Time: 08/07/19  6:05 AM   Result Value Ref Range    Free T4 0 97 0 76 - 1 46 ng/dL   ECG 12 lead    Collection Time: 08/09/19  4:10 PM   Result Value Ref Range    Ventricular Rate 70 BPM    Atrial Rate 70 BPM    OK Interval 126 ms    QRSD Interval 62 ms    QT Interval 520 ms    QTC Interval 561 ms    P Axis 70 degrees    QRS Axis 66 degrees    T Wave Axis 61 degrees   Lithium level    Collection Time: 08/10/19  5:14 AM   Result Value Ref Range    Lithium Lvl 1 1 0 6 - 1 2 mmol/L   Valproic acid level, total    Collection Time: 08/10/19  5:14 AM   Result Value Ref Range    Valproic Acid, Total 62 9 50 - 120 ug/mL   ECG 12 lead    Collection Time: 08/12/19 10:42 AM   Result Value Ref Range    Ventricular Rate 79 BPM    Atrial Rate 79 BPM    OK Interval 128 ms    QRSD Interval 80 ms    QT Interval 350 ms    QTC Interval 401 ms    P Axis -10 degrees    QRS Axis -2 degrees    T Wave Axis 170 degrees            Physical Exam   Constitutional: She is oriented to person, place, and time  She appears well-developed and well-nourished  No distress  HENT:   Head: Normocephalic and atraumatic  Right Ear: External ear normal    Left Ear: External ear normal    Mouth/Throat: Oropharynx is clear and moist    Eyes: Pupils are equal, round, and reactive to light  Conjunctivae are normal  No scleral icterus  Neck: Normal range of motion  Neck supple  No thyromegaly present  Cardiovascular: Normal rate, regular rhythm and normal heart sounds  Pulmonary/Chest: Effort normal and breath sounds normal  No respiratory distress  Abdominal: Soft  Bowel sounds are normal  She exhibits no distension  Musculoskeletal: Normal range of motion  She exhibits no edema  Neurological: She is alert and oriented to person, place, and time  Skin: Skin is warm and dry  Psychiatric: Her behavior is normal  Judgment and thought content normal    Patient feels nervous    Vitals reviewed

## 2019-08-21 NOTE — ASSESSMENT & PLAN NOTE
Patient will continue with lithium as well as Depakote and Klonopin  Patient's medications will be managed by Psychiatry  Patient has follow-up appointment tomorrow at which point it will be determined if she may return to work or not  Patient states she has been taking medications as instructed

## 2019-08-22 ENCOUNTER — OFFICE VISIT (OUTPATIENT)
Dept: PSYCHIATRY | Facility: CLINIC | Age: 44
End: 2019-08-22
Payer: COMMERCIAL

## 2019-08-22 ENCOUNTER — ANESTHESIA (OUTPATIENT)
Dept: PREOP/PACU | Facility: HOSPITAL | Age: 44
End: 2019-08-22

## 2019-08-22 ENCOUNTER — HOSPITAL ENCOUNTER (OUTPATIENT)
Dept: PREOP/PACU | Facility: HOSPITAL | Age: 44
Setting detail: OUTPATIENT SURGERY
Discharge: HOME | End: 2019-08-22
Payer: COMMERCIAL

## 2019-08-22 VITALS
DIASTOLIC BLOOD PRESSURE: 66 MMHG | SYSTOLIC BLOOD PRESSURE: 133 MMHG | OXYGEN SATURATION: 98 % | HEART RATE: 87 BPM | RESPIRATION RATE: 16 BRPM | TEMPERATURE: 97.4 F

## 2019-08-22 VITALS
WEIGHT: 155.4 LBS | BODY MASS INDEX: 27.54 KG/M2 | SYSTOLIC BLOOD PRESSURE: 89 MMHG | DIASTOLIC BLOOD PRESSURE: 63 MMHG | RESPIRATION RATE: 16 BRPM | HEART RATE: 68 BPM | HEIGHT: 63 IN

## 2019-08-22 DIAGNOSIS — R41.89 COGNITIVE CHANGES: ICD-10-CM

## 2019-08-22 DIAGNOSIS — F31.5 BIPOLAR I DISORDER, SEVERE, CURRENT OR MOST RECENT EPISODE DEPRESSED, WITH PSYCHOTIC FEATURES (HCC): Primary | ICD-10-CM

## 2019-08-22 DIAGNOSIS — F10.20 ALCOHOL USE DISORDER, MODERATE, IN CONTROLLED ENVIRONMENT (HCC): ICD-10-CM

## 2019-08-22 PROCEDURE — 99214 OFFICE O/P EST MOD 30 MIN: CPT | Performed by: NURSE PRACTITIONER

## 2019-08-22 PROCEDURE — 90870 ELECTROCONVULSIVE THERAPY: CPT

## 2019-08-22 RX ORDER — ETOMIDATE 2 MG/ML
INJECTION INTRAVENOUS AS NEEDED
Status: DISCONTINUED | OUTPATIENT
Start: 2019-08-22 | End: 2019-08-22 | Stop reason: SURG

## 2019-08-22 RX ORDER — SUCCINYLCHOLINE/SOD CL,ISO/PF 100 MG/5ML
SYRINGE (ML) INTRAVENOUS AS NEEDED
Status: DISCONTINUED | OUTPATIENT
Start: 2019-08-22 | End: 2019-08-22 | Stop reason: SURG

## 2019-08-22 RX ORDER — GLYCOPYRROLATE 0.2 MG/ML
INJECTION INTRAMUSCULAR; INTRAVENOUS AS NEEDED
Status: DISCONTINUED | OUTPATIENT
Start: 2019-08-22 | End: 2019-08-22 | Stop reason: SURG

## 2019-08-22 RX ORDER — ESMOLOL HYDROCHLORIDE 10 MG/ML
INJECTION INTRAVENOUS AS NEEDED
Status: DISCONTINUED | OUTPATIENT
Start: 2019-08-22 | End: 2019-08-22 | Stop reason: SURG

## 2019-08-22 RX ORDER — ONDANSETRON 2 MG/ML
INJECTION INTRAMUSCULAR; INTRAVENOUS AS NEEDED
Status: DISCONTINUED | OUTPATIENT
Start: 2019-08-22 | End: 2019-08-22 | Stop reason: SURG

## 2019-08-22 RX ORDER — SODIUM CHLORIDE 9 MG/ML
125 INJECTION, SOLUTION INTRAVENOUS CONTINUOUS
Status: DISCONTINUED | OUTPATIENT
Start: 2019-08-22 | End: 2019-08-26 | Stop reason: HOSPADM

## 2019-08-22 RX ADMIN — Medication 80 MG: at 06:50

## 2019-08-22 RX ADMIN — SODIUM CHLORIDE 125 ML/HR: 0.9 INJECTION, SOLUTION INTRAVENOUS at 05:56

## 2019-08-22 RX ADMIN — ETOMIDATE 12 MG: 2 INJECTION, SOLUTION INTRAVENOUS at 06:49

## 2019-08-22 RX ADMIN — GLYCOPYRROLATE 0.2 MG: 0.2 INJECTION, SOLUTION INTRAMUSCULAR; INTRAVENOUS at 06:43

## 2019-08-22 RX ADMIN — ESMOLOL HYDROCHLORIDE 50 MG: 10 INJECTION, SOLUTION INTRAVENOUS at 06:50

## 2019-08-22 RX ADMIN — ONDANSETRON HYDROCHLORIDE 4 MG: 2 INJECTION, SOLUTION INTRAMUSCULAR; INTRAVENOUS at 06:43

## 2019-08-22 NOTE — PSYCH
MEDICATION MANAGEMENT NOTE        [facility]      Name and Date of Birth:  Collin Rush 38 y o  1975 MRN: [id number]    Date of Visit: 2019    SUBJECTIVE:    Beryle Dolores is seen today for a follow up for Bipolar Disorder and insomnia  Patient is s/p admission to the inpatient psychiatric unit at [facility] from 19-19 for depression with suicidal ideation  She is s/p 6 ECT treatments, her last treatment was today, 19  Patient denies headaches today, she reports memory difficulties and she denies all other side effects from ECT  I can't even remember my bosses name    Patient also completed on her medical leave of absence paperwork that she works for [employer] and that she works at [employer] when in fact 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  Discussed concerns about her returning to work with having issues with her memory, explained that I will be sending her for neuro psychological testing for a full evaluation of memory, medical leave of absence paperwork will be completed for patient with explanation that patient is having some memory difficulties  Patient states she is grieving as her brother just passed away suddenly on 19 from a heart attack and he lives in [place]  Patient states she would like to return to [place] for his  but she cannot afford to go due to being out of work for 3 weeks for her hospitalization  Patient states overall her depression is much better, no longer having suicidal thoughts, at times her mind races when she is trying to sleep, she denies hallucinations  Patient states, "I am ready to go back to work, financially I have to go "  Patient reports it would be a bigger stressor if she does not go back to work  Patient states her last drink of alcohol was prior to hospitalization      She denies auditory hallucinations, denies visual hallucinations, denies delusional thinking  HPI ROS:           ('was' notes: recent => remote)  Medication Side Effects:  denies  feels her hair is falling out from Depakote  Depression (10 worst): 3 (Was 9)   Anxiety (10 worst): 4 (Was 9 5)   Safety concerns (SI, HI, etc): Denies SI/HI (Was SI with plan to "buy a gun and shoot myself in the chest or femoral   I have to work though so I will probably not have time to buy a gun so I would have to overdose but I promised myself if I did it again I would shoot myself "  Denies HI )   Sleep: "8 hours or so- wakes up for bathroom, sometimes mind races" (Was Going to bed at 8 pm waking at 530 am 1/2 hour prior to alarm going off  States "I am waking up every 1/2 hour but I don't look at the clock, I am not really sure that it is every 1/2 hour, it is just a lot  I have nightmares about every night, at least 1 sometimes multiple, either my teeth are falling out of my mouth or bugs are crawling out of my skin or it is Armageddon "  Patient has not yet picked up or started Prazosin that was prescribed on 8/2/19)   Energy:  Moderate, low motivation (Was Energy and motivation are non-existent)   Appetite: fair (Was increased)   Weight Change: 155 4 lbs 1545 6 lbs       Review Of Systems:      Constitutional negative   ENT negative   Cardiovascular low blood pressure and as noted in HPI   Respiratory negative   Gastrointestinal negative   Genitourinary negative   Musculoskeletal negative   Integumentary negative   Neurological poor memory and as noted in HPI   Endocrine negative   Other Symptoms none       The following portions of the patient's history were reviewed and updated as appropriate: past family history, past medical history, past social history, past surgical history and problem list     Past Medical History:    Past Medical History:   Diagnosis Date    Addiction to drug [facility] [address] [facility]     Alcohol abuse     Alcoholism ([facility] [address] [facility]     Anemia due to vitamin B12 deficiency     last assessed 10/10/17, iron deficiency anemia    Bipolar affective disorder (HCC)     current episode mixed, current episode severity unspecified, last assessed 3/8/17    Depression     Hallucination     Psychosis ([place] [address] )     Pyelonephritis     Self-injurious behavior     Sleep difficulties     Status post electroconvulsive therapy 08/22/2019    s/p 6 treatments last treatment 8/22/19    Suicide attempt ([place] [address] )     Vitamin D deficiency      Past Medical History Pertinent Negatives:   Diagnosis Date Noted    Head injury 08/05/2019    Seizures ([place] [address] ) 08/05/2019     Past Surgical History:   Procedure Laterality Date    TOTAL ABDOMINAL HYSTERECTOMY  2014    TUBAL LIGATION      WISDOM TOOTH EXTRACTION       Allergies   Allergen Reactions    Lamotrigine Rash and Other (See Comments)     Jefm Sayres syndrome    Venofer [Iron Sucrose] Anaphylaxis     IV Venofer    Sulfa Antibiotics Rash       Substance Abuse History:    Social History     Substance and Sexual Activity   Alcohol Use Yes    Alcohol/week: 2 0 - 5 0 standard drinks    Types: 2 - 5 Cans of beer per week    Frequency: 2-3 times a week    Drinks per session: 3 or 4    Comment: drinking 2-5 beers at a time 2-3 X per week     Social History     Substance and Sexual Activity   Drug Use Not Currently       Social History:    Social History     Socioeconomic History    Marital status:      Spouse name: Not on file    Number of children: 3    Years of education: Not on file    Highest education level:  Bachelor's degree (e g , BA, AB, BS)   Occupational History    Occupation: RN      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    Social Needs    Financial resource strain: Not very hard    Food insecurity:     Worry: Never true     Inability: Never true    Transportation needs:     Medical: No     Non-medical: No   Tobacco Use    Smoking status: Former Smoker     Types: Cigarettes     Last attempt to quit: 2018     Years since quittin 9    Smokeless tobacco: Never Used    Tobacco comment: 1-3 cigs/day-11 total   Substance and Sexual Activity    Alcohol use:  Yes     Alcohol/week: 2 0 - 5 0 standard drinks     Types: 2 - 5 Cans of beer per week     Frequency: 2-3 times a week     Drinks per session: 3 or 4     Comment: drinking 2-5 beers at a time 2-3 X per week    Drug use: Not Currently    Sexual activity: Not Currently     Partners: Male   Lifestyle    Physical activity:     Days per week: 0 days     Minutes per session: Not on file    Stress: Rather much   Relationships    Social connections:     Talks on phone: Once a week     Gets together: Once a week     Attends Yazidi service: 1 to 4 times per year     Active member of club or organization: No     Attends meetings of clubs or organizations: Never     Relationship status:     Intimate partner violence:     Fear of current or ex partner: No     Emotionally abused: No     Physically abused: No     Forced sexual activity: No   Other Topics Concern    Not on file   Social History Narrative    Always uses seatbelt    Caffeine use: 3-4 cans/cup/day    Does not exercise       Family Psychiatric History:     Family History   Problem Relation Age of Onset    Hypertension Mother     Depression Mother     Anxiety disorder Mother     Neuropathy Mother     ADD / ADHD Father     Hypertension Father     ADD / ADHD Son     Asthma Son     Asthma Daughter     Anxiety disorder Daughter     Alcohol abuse Sister     No Known Problems Brother     Hypertension Maternal Grandmother     Stroke Maternal Grandmother     Dementia Maternal Grandmother     Stomach cancer Maternal Grandfather     Colon cancer Maternal Grandfather 66    Stroke Paternal Grandfather     Depression Brother     Anxiety disorder Brother     Alcohol abuse Brother     Depression Son     No Known Problems Paternal Grandmother     Depression Brother    Howard Alcohol abuse Brother     Heart attack Brother 47        08/19/2019-passed     No Known Problems Brother     Ovarian cancer Other        History Review: The following portions of the patient's history were reviewed and updated as appropriate: allergies, current medications, past family history, past medical history, past social history, past surgical history and problem list          OBJECTIVE:     Vital signs in last 24 hours:    Vitals:    08/22/19 1617   BP: (!) 89/63   BP Location: Right arm   Patient Position: Sitting   Pulse: 68   Resp: 16   Weight: 70 5 kg (155 lb 6 4 oz)   Height: 5' 3" (1 6 m)       Mental Status Evaluation:    Appearance age appropriate, casually dressed   Behavior cooperative, somewhat anxious, slightly better eye contact   Speech increased latency of response, soft   Mood slightly anxious, slightly depressed   Affect blunted   Thought Processes linear   Associations intact associations   Thought Content no overt delusions   Perceptual Disturbances: no auditory hallucinations, no visual hallucinations   Abnormal Thoughts  Risk Potential Suicidal ideation - None  Homicidal ideation - None  Potential for aggression - No   Orientation oriented to person, place, time/date and situation   Memory recent memory mildly impaired, remote memory intact   Consciousness alert and awake   Attention Span Concentration Span attention span and concentration appear shorter than expected for age   Intellect appears to be of average intelligence   Insight fair   Judgement fair   Muscle Strength and  Gait normal muscle strength and normal muscle tone, normal gait and normal balance   Motor activity no abnormal movements   Pain none   Pain Scale 0       Laboratory Results:   I have personally reviewed all pertinent laboratory/tests results    Most Recent Labs:   Lab Results   Component Value Date    WBC 5 40 08/06/2019    RBC 3 81 (L) 08/06/2019    HGB 12 1 08/06/2019    HCT 36 6 08/06/2019     08/06/2019    RDW 12 9 08/06/2019    NEUTROABS 3 20 08/06/2019    K 4 2 08/06/2019     08/06/2019    CO2 27 08/06/2019    BUN 11 08/06/2019    CREATININE 0 74 08/06/2019    CALCIUM 8 7 08/06/2019    AST 15 08/06/2019    ALT 9 08/06/2019    ALKPHOS 33 (L) 08/06/2019    HDL 86 (H) 08/03/2019    TRIG 83 08/03/2019    LDLCALC 72 08/03/2019    VALPROICTOT 62 9 08/10/2019    LITHIUM 1 1 08/10/2019    EFR4EBMBRRPG 1 760 08/07/2019    FREET4 0 97 08/07/2019     CBC:   Lab Results   Component Value Date    WBC 5 40 08/06/2019    RBC 3 81 (L) 08/06/2019    HGB 12 1 08/06/2019    HCT 36 6 08/06/2019    MCV 96 08/06/2019     08/06/2019    MCH 31 8 08/06/2019    MCHC 33 2 08/06/2019    RDW 12 9 08/06/2019    MPV 8 7 (L) 08/06/2019    NRBC 0 08/03/2019    NEUTROABS 3 20 08/06/2019     BMP:   Lab Results   Component Value Date    K 4 2 08/06/2019     08/06/2019    CO2 27 08/06/2019    BUN 11 08/06/2019    CREATININE 0 74 08/06/2019    CALCIUM 8 7 08/06/2019    EGFR 98 08/06/2019     CMP:   Lab Results   Component Value Date    K 4 2 08/06/2019     08/06/2019    CO2 27 08/06/2019    BUN 11 08/06/2019    CREATININE 0 74 08/06/2019    CALCIUM 8 7 08/06/2019    AST 15 08/06/2019    ALT 9 08/06/2019    ALKPHOS 33 (L) 08/06/2019    EGFR 98 08/06/2019     Lipid Profile:   Lab Results   Component Value Date    HDL 86 (H) 08/03/2019    TRIG 83 08/03/2019    LDLCALC 72 08/03/2019     Liver Enzymes:   Lab Results   Component Value Date    AST 15 08/06/2019    ALT 9 08/06/2019    ALKPHOS 33 (L) 08/06/2019     Thyroid Studies:   Lab Results   Component Value Date    IQB0ESPZDIMK 1 760 08/07/2019    FREET4 0 97 08/07/2019     RPR: No results found for: RPR  Lithium:   Lab Results   Component Value Date    LITHIUM 1 1 08/10/2019     Pregnancy: No results found for: PREGTESTUR, PREGSERUM, HCG, HCGQUANT  Hemoglobin A1C/EST AVG Glucose No results found for: HGBA1C, EAG  EKG   Lab Results   Component Value Date    VENTRATE 79 2019    ATRIALRATE 79 2019    PRINT 128 2019    QRSDINT 80 2019    QTINT 350 2019    PAXIS -10 2019    QRSAXIS -2 2019    TWAVEAXIS 170 2019       No results found for this or any previous visit  Scales:    PHQ-2=2  MICHAEL-7=5  MOCA     Assessment/Plan:       Diagnoses and all orders for this visit:    Bipolar I disorder, severe, current or most recent episode depressed, with psychotic features ([facility] [address] )  -     Ambulatory referral to Neuropsychology; Future    Alcohol use disorder, moderate, in controlled environment [facility])    Cognitive changes  -     Ambulatory referral to Neuropsychology; Future          Treatment Recommendations/Precautions/Plan:    Leopoldo Pies is status post discharge from [address] [place] [address] behavioral health unit she was admitted from  through  for the depressive phase of bipolar with suicidal ideation with plan to purchase a gun and shoot still of in the femoral artery or aorta/chest   If she was unable to obtain a gun she was going to overdose on medication  She was sent from her last office visit here with me directly to the hospital for admission  During her hospitalization she received ECT treatment and completed her last ECT treatment today as an outpatient  She suffered the loss of her brother from an unexpected heart attack, he lives in [facility] and she is unable to attend the  which will be on 2019 as she cannot afford transportation or additional time off from work  The patient brought in medical leave of absence paperwork today requesting to return to work on   Upon reviewing the paperwork it was noted that the patient wrote that she was worked at [employer] and on another section she wrote that she worked for [employer]    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, the patient actually commented that she could not recall her bosses name and she had been having significant difficulty with her memory  This provider completed a Chuckey with the patient and she scored 23/30 struggling with memory/recall and language  I discussed concerns about her return to work, she reported that it would be a huge stressor if she was unable to return to work as is a big financial strain for her  The patient is able to hold a conversation though she is slightly soft-spoken and delayed slightly in her responses  It is difficult to tell if this is due to her being post ECT today or if this is a longer-term affect from her treatment  The patient reports to this provider that she always struggles with memory and her boss is well aware of this  The patient was agreeable to go for neuro psychological testing to evaluate cognitive deficit, memory and recall/language issues  And ambulatory referral for this was placed  I did complete the medical leave of absence paperwork and completed that the patient may not be able to perform job functions to full potential due to possibility of memory recall and language difficulty at this time and explained that neuro psychological testing was ordered for further evaluation of memory  Patient has been educated about their diagnosis and treatment modalities  They voiced understanding and agreement with the following plan:    -Continue Lithium  mg p o  Q a m   And 450 mg p o  Q p m  to help with mood stabilization -has script from hospital    -Continue Depakote 500 mg p o  B i d  to help with mood stabilization -has script from hospital    -Continue Klonopin 0 5 mg p o  Q h s  to improve anxiety symptoms and to help with sleep-has script from hospital    -Medication management every 2 weeks     -Referral for individual psychotherapy    -Referral for neuropsychological assessment-cognitive changes, patient concerns with decreased memory, recall and language, Chuckey 23/30 today-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, she had completed incorrect information on her medical leave of absence paperwork specifically rating that she worked for [facility] [employer] [employer] [employer], worked at [employer] when she works for [employer]  -Medication regimen discussed in detail today for 10 minutes with Consueloyaakov Flahertyh  Dosing schedule reviewed    -Discussed self monitoring of symptoms, and symptom monitoring tools     -Patient has been informed of 24 hours and weekend coverage for urgent situations accessed by calling the main clinic phone number      -Completed and signed during the session: Not applicable - Treatment Plan not due at this session    Risks/Benefits      Risks, Benefits And Possible Side Effects Of Medications:    Risks, benefits, and possible side effects of medications explained to Consuelo Beth and she verbalizes understanding and agreement for treatment  Controlled Medication Discussion:     Consuelo Ryan has been filling controlled prescriptions on time as prescribed according to Jessica [address]    Discussed with Consuelo Ryan the risks of sedation, respiratory depression, impairment of ability to drive and potential for abuse and addiction related to treatment with benzodiazepine medications  She understands risk of treatment with benzodiazepine medications, agrees to not drive if feels impaired and agrees to take medications as prescribed  Discussed with Lyndsay Sainz warning on concurrent use of benzodiazepines and opioid medications including sedation, respiratory depression, coma and death  She understands the risk of treatment with benzodiazepines in addition to opioids and wants to continue taking those medications  Psychotherapy Provided:     Individual psychotherapy provided: Yes  Counseling was provided during the session today for 15 minutes  Medications, treatment progress and treatment plan reviewed with Consuelo Ryan    Recent stressor including family issues, death of brother, financial problems, job stress, limited support, social difficulties and chronic mental illness discussed with Andi  Coping skills reviewed with Andi  Importance of medication and treatment compliance reviewed with Andi  Discussed with Andi acceptance of mental illness diagnosis and need for ongoing psychiatric treatment  Importance of follow up for substance abuse issues discussed with Andi  Reassurance and supportive therapy provided  Crisis/safety plan discussed with CHENTE Dominguez 08/22/19

## 2019-08-22 NOTE — OP NOTE
Procedure Note - ECT  Mechele Osgood 38 y o  female MRN: [id number]    Time out was taken with staff to confirm correct patient and correct procedure to be performed  Pt is moderately depressed, no SI  Grieving regarding one of her brothers who passed away last week  Not tearful  Affect is modestly restricted  Takes her medications, including Lithium  The writer reminded about not using Motrin and similar antiinflammatory medications if taking Lithium  Pt stated that she remembers that  No memory problems        Session Number: 007    Diagnosis: Depressive episode bipolar disorder    ECT Type: Outpatient, Acute    Anesthesia: Etomidate    Electrode Placement: right unilateral    Energy level: 50%      Seizure Duration     EE sec  EM sec    Results:Clinical seizure was satisfactory, Patient tolerated ECT well, Complications: none

## 2019-08-22 NOTE — ANESTHESIA POSTPROCEDURE EVALUATION
Post-Op Assessment Note    CV Status:  Stable  Pain Score: 0    Pain management: adequate     Mental Status:  Lethargic, anxious and sleepy   Hydration Status:  Stable   PONV Controlled:  None   Airway Patency:  Patent   Post Op Vitals Reviewed: Yes      Staff: CRNA           BP   124/83   Temp      Pulse  111   Resp   24   SpO2   2l nc 97%

## 2019-08-23 ENCOUNTER — TELEPHONE (OUTPATIENT)
Dept: PSYCHIATRY | Facility: CLINIC | Age: 44
End: 2019-08-23

## 2019-08-23 DIAGNOSIS — F31.5 BIPOLAR I DISORDER, SEVERE, CURRENT OR MOST RECENT EPISODE DEPRESSED, WITH PSYCHOTIC FEATURES (HCC): ICD-10-CM

## 2019-08-23 RX ORDER — DIVALPROEX SODIUM 500 MG/1
500 TABLET, DELAYED RELEASE ORAL 2 TIMES DAILY
Qty: 60 TABLET | Refills: 0 | Status: SHIPPED | OUTPATIENT
Start: 2019-08-23 | End: 2019-09-11 | Stop reason: SDUPTHER

## 2019-08-23 RX ORDER — LITHIUM CARBONATE 300 MG/1
300 TABLET, FILM COATED, EXTENDED RELEASE ORAL DAILY
Qty: 30 TABLET | Refills: 0 | Status: SHIPPED | OUTPATIENT
Start: 2019-08-23 | End: 2019-09-11 | Stop reason: SDUPTHER

## 2019-08-23 RX ORDER — LITHIUM CARBONATE 450 MG
450 TABLET, EXTENDED RELEASE ORAL
Qty: 30 TABLET | Refills: 0 | Status: SHIPPED | OUTPATIENT
Start: 2019-08-23 | End: 2019-09-11 | Stop reason: SDUPTHER

## 2019-08-23 RX ORDER — TRAZODONE HYDROCHLORIDE 150 MG/1
150 TABLET ORAL
Qty: 30 TABLET | Refills: 0 | Status: SHIPPED | OUTPATIENT
Start: 2019-08-23 | End: 2019-09-11 | Stop reason: SDUPTHER

## 2019-08-23 RX ORDER — CLONAZEPAM 0.5 MG/1
0.5 TABLET ORAL
Qty: 30 TABLET | Refills: 0 | Status: SHIPPED | OUTPATIENT
Start: 2019-08-23 | End: 2019-09-11 | Stop reason: SDUPTHER

## 2019-08-23 NOTE — TELEPHONE ENCOUNTER
Patient was recently in the hospital and all of her medications were put in as printed scripts but she did not receive any of them can you please resend in violet's absence

## 2019-08-27 ENCOUNTER — TELEPHONE (OUTPATIENT)
Dept: PSYCHIATRY | Facility: CLINIC | Age: 44
End: 2019-08-27

## 2019-08-27 NOTE — LETTER
August 27, 2019     Patient: Jeremias Kaba   YOB: 1975   Date of Visit: 8/27/2019       To Whom It May Concern: It is my medical opinion that Jeremias Kaba should remain out of work until she undergoes neuropsychological testing  I spoke with Andi on August 26, 2019 when she stopped into the office requesting a note to release her to full duty  I appreciate that Andi is very eager to return to work however further evaluation of her cognitive status needs to be completed by a neuropsychologist   I explained my concerns related to her cognitive deficits as it relates to her ability to perform her job, this was also discussed at her office visit on 08/22/2019  I explained on both occasions that she will need neuropsychological testing to evaluate her cognitive status fully  We again discussed her [place] Cognitive Assessment [facility]) results for which she scored a 23/30  The [facility] test specifically revealed deficits in memory, recall and language  For this reason 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  If you have any questions or concerns, please don't hesitate to call           Sincerely,        CHENTE Zhang    CC: No Recipients

## 2019-08-28 NOTE — TELEPHONE ENCOUNTER
Wrote letter for no return to work until after neuropsych testing complete    Will recall Andi in AM

## 2019-08-28 NOTE — TELEPHONE ENCOUNTER
I spoke with Sheryl Alba today to reiterate our conversation that she will require neuropsychological testing due to her scores on the [facility] and I will not be able to write a release back to work until I have the results of the neuropsychological testing  Sheryl Alba states she only recalled this provider stating "I am not sure if I can send you back to work "  I again explained her testing and she became explosive on the phone with me stating,  'It will be best for everybody if I never work again "  I asked if she could explain what she meant by that statement  , she replied, "the only issues that I will ever have to 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

## 2019-09-05 ENCOUNTER — TELEPHONE (OUTPATIENT)
Dept: BEHAVIORAL/MENTAL HEALTH CLINIC | Facility: CLINIC | Age: 44
End: 2019-09-05

## 2019-09-05 NOTE — TELEPHONE ENCOUNTER
Pt came in to office to follow up on msg left this morning  Pt states she really needs to speak to you ASAP  Pt is stating the the neuropsychological test you sent her for was only to meet the doctor  They informed her that the test would not happen until November  Pt can be reached at [phone]

## 2019-09-05 NOTE — TELEPHONE ENCOUNTER
Patient states she met with Dr Iesha Tucker at his office  She states she had an intake only  She states "after 45 minutes I found out I had to come back for testing and it wasn't going to be until November so I walked out of the office "  She reported that she did not see the point of having the test done  She was demanding of this provider stating, "I need to know if you are sending me back to work on Monday or not, I need to either get a list of homeless shelters for around here in the next 3 weeks or I am moving back to [place] with my parents or something "  I explained that I would call Dr Danielle Primrose office to try to coordinate testing obtain some insight into the visit that she had with him  She was agreeable to this

## 2019-09-05 NOTE — TELEPHONE ENCOUNTER
Patient left message regarding getting paperwork done quickly so she can get back to work   Would like a call back

## 2019-09-10 NOTE — TELEPHONE ENCOUNTER
Spoke with Con Stack at Dr Tony Reach office [phone] to follow-up on Jayla's appointment from 9/4/19  Con Stack reports take appointment for evaluation to determine what testing would need to be done  When patient came out to schedule testing and staff told her the 1st appointment was 1st week of November Kaylee Song allegedly became irate and fuming and stormed out of the office without scheduling the actual neuro psychological testing  Con Stack recommend that I leave a message for Dr Guillaume Shanks to return my call  I did this awaiting return call back

## 2019-09-11 ENCOUNTER — OFFICE VISIT (OUTPATIENT)
Dept: PSYCHIATRY | Facility: CLINIC | Age: 44
End: 2019-09-11
Payer: COMMERCIAL

## 2019-09-11 VITALS
RESPIRATION RATE: 16 BRPM | BODY MASS INDEX: 27.8 KG/M2 | HEIGHT: 63 IN | SYSTOLIC BLOOD PRESSURE: 119 MMHG | DIASTOLIC BLOOD PRESSURE: 77 MMHG | HEART RATE: 102 BPM | WEIGHT: 156.9 LBS

## 2019-09-11 DIAGNOSIS — F10.20 ALCOHOL USE DISORDER, MODERATE, IN CONTROLLED ENVIRONMENT (HCC): ICD-10-CM

## 2019-09-11 DIAGNOSIS — Z79.899 HIGH RISK MEDICATION USE: ICD-10-CM

## 2019-09-11 DIAGNOSIS — F31.5 BIPOLAR I DISORDER, SEVERE, CURRENT OR MOST RECENT EPISODE DEPRESSED, WITH PSYCHOTIC FEATURES (HCC): Primary | ICD-10-CM

## 2019-09-11 DIAGNOSIS — E78.5 BORDERLINE HYPERLIPIDEMIA: ICD-10-CM

## 2019-09-11 PROCEDURE — 99214 OFFICE O/P EST MOD 30 MIN: CPT | Performed by: NURSE PRACTITIONER

## 2019-09-11 PROCEDURE — 90833 PSYTX W PT W E/M 30 MIN: CPT | Performed by: NURSE PRACTITIONER

## 2019-09-11 RX ORDER — TRAZODONE HYDROCHLORIDE 150 MG/1
150 TABLET ORAL
Qty: 30 TABLET | Refills: 0 | Status: SHIPPED | OUTPATIENT
Start: 2019-09-11 | End: 2019-10-11

## 2019-09-11 RX ORDER — DIVALPROEX SODIUM 500 MG/1
500 TABLET, DELAYED RELEASE ORAL 2 TIMES DAILY
Qty: 60 TABLET | Refills: 0 | Status: SHIPPED | OUTPATIENT
Start: 2019-09-11 | End: 2019-10-11

## 2019-09-11 RX ORDER — LITHIUM CARBONATE 300 MG/1
300 TABLET, FILM COATED, EXTENDED RELEASE ORAL DAILY
Qty: 30 TABLET | Refills: 0 | Status: SHIPPED | OUTPATIENT
Start: 2019-09-11 | End: 2019-10-11

## 2019-09-11 RX ORDER — LITHIUM CARBONATE 450 MG
450 TABLET, EXTENDED RELEASE ORAL
Qty: 30 TABLET | Refills: 0 | Status: SHIPPED | OUTPATIENT
Start: 2019-09-11 | End: 2019-10-11

## 2019-09-11 RX ORDER — CLONAZEPAM 0.5 MG/1
0.5 TABLET ORAL
Qty: 30 TABLET | Refills: 0 | Status: SHIPPED | OUTPATIENT
Start: 2019-09-11 | End: 2019-10-11
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
## 2019-09-11 NOTE — LETTER
September 11, 2019     Patient: Emily Stauffer   YOB: 1975   Date of Visit: 9/11/2019     Attn: [address] Leave of Absence    Emily Stauffer is under my professional care  She was seen in my office on 9/11/2019  She may return to work on Tuesday, 9/17/19 without restrictions  Her repeat MOCA was within normal limits  If you have any questions or concerns, please don't hesitate to call           Sincerely,          CHENTE Jackson        CC: Rupali Bolton

## 2019-09-12 ENCOUNTER — TRANSCRIBE ORDERS (OUTPATIENT)
Dept: LAB | Facility: OTHER | Age: 44
End: 2019-09-12

## 2019-09-12 ENCOUNTER — APPOINTMENT (OUTPATIENT)
Dept: LAB | Facility: OTHER | Age: 44
End: 2019-09-12
Payer: COMMERCIAL

## 2019-09-12 DIAGNOSIS — Z79.899 HIGH RISK MEDICATION USE: ICD-10-CM

## 2019-09-12 DIAGNOSIS — F31.5 BIPOLAR I DISORDER, SEVERE, CURRENT OR MOST RECENT EPISODE DEPRESSED, WITH PSYCHOTIC FEATURES (HCC): ICD-10-CM

## 2019-09-12 LAB
ALBUMIN SERPL BCP-MCNC: 3.8 G/DL (ref 3.5–5)
ALP SERPL-CCNC: 41 U/L (ref 46–116)
ALT SERPL W P-5'-P-CCNC: 14 U/L (ref 12–78)
ANION GAP SERPL CALCULATED.3IONS-SCNC: 5 MMOL/L (ref 4–13)
AST SERPL W P-5'-P-CCNC: 7 U/L (ref 5–45)
BASOPHILS # BLD AUTO: 0.03 THOUSANDS/ΜL (ref 0–0.1)
BASOPHILS NFR BLD AUTO: 1 % (ref 0–1)
BILIRUB SERPL-MCNC: 0.2 MG/DL (ref 0.2–1)
BUN SERPL-MCNC: 11 MG/DL (ref 5–25)
CALCIUM SERPL-MCNC: 9.2 MG/DL (ref 8.3–10.1)
CHLORIDE SERPL-SCNC: 109 MMOL/L (ref 100–108)
CO2 SERPL-SCNC: 29 MMOL/L (ref 21–32)
CREAT SERPL-MCNC: 0.89 MG/DL (ref 0.6–1.3)
EOSINOPHIL # BLD AUTO: 0.17 THOUSAND/ΜL (ref 0–0.61)
EOSINOPHIL NFR BLD AUTO: 3 % (ref 0–6)
ERYTHROCYTE [DISTWIDTH] IN BLOOD BY AUTOMATED COUNT: 12.6 % (ref 11.6–15.1)
GFR SERPL CREATININE-BSD FRML MDRD: 79 ML/MIN/1.73SQ M
GLUCOSE P FAST SERPL-MCNC: 75 MG/DL (ref 65–99)
HCT VFR BLD AUTO: 39.7 % (ref 34.8–46.1)
HGB BLD-MCNC: 12.2 G/DL (ref 11.5–15.4)
IMM GRANULOCYTES # BLD AUTO: 0.02 THOUSAND/UL (ref 0–0.2)
IMM GRANULOCYTES NFR BLD AUTO: 0 % (ref 0–2)
LITHIUM SERPL-SCNC: 1.1 MMOL/L (ref 0.5–1)
LYMPHOCYTES # BLD AUTO: 2.29 THOUSANDS/ΜL (ref 0.6–4.47)
LYMPHOCYTES NFR BLD AUTO: 38 % (ref 14–44)
MCH RBC QN AUTO: 31.3 PG (ref 26.8–34.3)
MCHC RBC AUTO-ENTMCNC: 30.7 G/DL (ref 31.4–37.4)
MCV RBC AUTO: 102 FL (ref 82–98)
MONOCYTES # BLD AUTO: 0.51 THOUSAND/ΜL (ref 0.17–1.22)
MONOCYTES NFR BLD AUTO: 9 % (ref 4–12)
NEUTROPHILS # BLD AUTO: 2.96 THOUSANDS/ΜL (ref 1.85–7.62)
NEUTS SEG NFR BLD AUTO: 49 % (ref 43–75)
NRBC BLD AUTO-RTO: 0 /100 WBCS
PLATELET # BLD AUTO: 203 THOUSANDS/UL (ref 149–390)
PMV BLD AUTO: 10.9 FL (ref 8.9–12.7)
POTASSIUM SERPL-SCNC: 4.3 MMOL/L (ref 3.5–5.3)
PROT SERPL-MCNC: 6.4 G/DL (ref 6.4–8.2)
RBC # BLD AUTO: 3.9 MILLION/UL (ref 3.81–5.12)
SODIUM SERPL-SCNC: 143 MMOL/L (ref 136–145)
TSH SERPL DL<=0.05 MIU/L-ACNC: 2.8 UIU/ML (ref 0.36–3.74)
VALPROATE SERPL-MCNC: 78 UG/ML (ref 50–100)
WBC # BLD AUTO: 5.98 THOUSAND/UL (ref 4.31–10.16)

## 2019-09-12 PROCEDURE — 80164 ASSAY DIPROPYLACETIC ACD TOT: CPT

## 2019-09-12 PROCEDURE — 36415 COLL VENOUS BLD VENIPUNCTURE: CPT

## 2019-09-12 PROCEDURE — 84443 ASSAY THYROID STIM HORMONE: CPT

## 2019-09-12 PROCEDURE — 85025 COMPLETE CBC W/AUTO DIFF WBC: CPT

## 2019-09-12 PROCEDURE — 80178 ASSAY OF LITHIUM: CPT

## 2019-09-12 PROCEDURE — 80053 COMPREHEN METABOLIC PANEL: CPT

## 2019-09-12 NOTE — TELEPHONE ENCOUNTER
Patient was seen in the office today September 11, 2019 please see my note    I did not receive a call back from neuro psychologist

## 2019-09-13 ENCOUNTER — TELEPHONE (OUTPATIENT)
Dept: PSYCHIATRY | Facility: CLINIC | Age: 44
End: 2019-09-13

## 2019-09-13 NOTE — TELEPHONE ENCOUNTER
Please call patient and let her know Lithium level is good 1 1 and Depakote level is good  Continue meds as she is currently taking

## 2019-09-16 NOTE — TELEPHONE ENCOUNTER
I spoke with Andi and reviewed information from Mayela Escamilla said she would check other values on My Chart later

## 2019-10-15 ENCOUNTER — DOCUMENTATION (OUTPATIENT)
Dept: PSYCHIATRY | Facility: CLINIC | Age: 44
End: 2019-10-15

## 2019-10-15 NOTE — PROGRESS NOTES
Appointment 8/20/19 was cancelled due to Provider being out of the office, Will RS at a later time      Supervised by Rosi Pastor MA

## 2019-11-04 ENCOUNTER — TELEPHONE (OUTPATIENT)
Dept: PSYCHIATRY | Facility: CLINIC | Age: 44
End: 2019-11-04

## 2019-11-04 NOTE — TELEPHONE ENCOUNTER
Patient called and left a message asking to speak to you  Not sure what it is in regards to, is there a provider that can review?

## 2019-11-04 NOTE — TELEPHONE ENCOUNTER
I returned Jayla's call - she sent a letter to Mayela Viera request her to write a letter explaining her last hospitalization  (She moved to [place] ) She's wondering if Mayela Viera received her letter?

## 2019-11-08 NOTE — TELEPHONE ENCOUNTER
Patient left a message again asking for a call from Edith Stoddard regarding whether she had received a letter

## 2019-11-13 ENCOUNTER — DOCUMENTATION (OUTPATIENT)
Dept: PSYCHIATRY | Facility: CLINIC | Age: 44
End: 2019-11-13

## 2019-11-13 DIAGNOSIS — Z72.0 TOBACCO ABUSE: ICD-10-CM

## 2019-11-13 DIAGNOSIS — F31.5 BIPOLAR I DISORDER, SEVERE, CURRENT OR MOST RECENT EPISODE DEPRESSED, WITH PSYCHOTIC FEATURES (HCC): Primary | ICD-10-CM

## 2019-11-13 DIAGNOSIS — E53.8 VITAMIN B12 DEFICIENCY: ICD-10-CM

## 2019-11-13 DIAGNOSIS — E78.5 BORDERLINE HYPERLIPIDEMIA: ICD-10-CM

## 2019-11-13 DIAGNOSIS — R45.851 SUICIDAL IDEATIONS: ICD-10-CM

## 2019-11-13 DIAGNOSIS — F10.20 ALCOHOL USE DISORDER, MODERATE, IN CONTROLLED ENVIRONMENT (HCC): ICD-10-CM

## 2019-11-13 DIAGNOSIS — E55.9 VITAMIN D DEFICIENCY: ICD-10-CM

## 2019-11-14 NOTE — PROGRESS NOTES
Psychiatric Discharge Summary     Admit Date:  08/05/2019  Discharge Date:  11/13/2019    Discharge Diagnosis:  1  Bipolar I disorder, severe, current or most recent episode depressed, with psychotic features ([facility] )     2  Alcohol use disorder, moderate, in controlled environment ([facility] )     3  Suicidal ideations     4  Tobacco abuse     5  Vitamin D deficiency     6  Vitamin B12 deficiency     7  Borderline hyperlipidemia         Treating Physician:  CHENTE Reeves/Keshawn Plummer DO/CHENTE Ward      Presenting Problems/Pertinent Findings:  [facility] [place] [address] [facility] dated 06/10/2019: Marianna Wellington is a 38 y o  female with a history of Bipolar Disorder who presents for psychiatric evaluation due to for psychiatric medication management  Primary complaints include DEPRESSIVE SYMPTOMS: sadness, hopelessness, low motivation, decreased interest, excessive guilt, difficulty with decision making, poor concentration, decreased memory, passive death wish and HYPOMANIC SYMPTOMS: periods of elevated mood alternating with periods of irritability and depressed mood, lasting several days in a row  Symptoms first started slowly several years ago and slowly worsened over the last few weeks  Stressors preceding visit included lack of health insurance, occupational problems, job loss, job stress, problems at work, stress at work, school stress, health issues, medical problems, chronic pain, legal problems, recent foreclosure, recent move, recent medication change, housing issues, social difficulties, everyday stressors and chronic mental illness  Marianna Wellington presents today due to a change of her insurance and by switching to [address] in April  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 [employer]CI[employer] Busch Marianna Wellington has been under the care of Dr Anita Johnson and was on Lithium 450 daily in the a and 300 mg at bedtime and Depakote 500 mg BID, Trazodone 100 mg at bedtime, and klonopin 0 5 mg prn daily  She reports she has had a Lithium level done on March (0 8) or in April and I am able to see a VPA of 50 done on 4/8  She reports last time she saw Dr Sneha Velasquez was 4 weeks ago for the past few months  She is currently finding her current job as a RN very stressful since she has to talk to lot of people and feels she is not good at it  She reports Roney York has been stable and reports sleep has also improved since she started to work day shifts she is sleeping 9-10 pm - 6 am  She has an extensive psych past history for over 20 years with last inpatient admission at [facility] in 9/2018 for overdosing  She reports her daughter found her and brought her to the hospital  She reports she knows who she has to call and knows the crisis phone numbers but she has never cared to call or ask for acute crisis help  Diony Guzman is unable to report last manic episode but states was probably when she overdosed  Therapist:  Did not want a therapist despite recommendations    Course of Treatment: Inpatient hospitalization from August 5, 2019 direct from office visit through August 16, 2019 where she underwent 6 ECT treatments for worsening depression and suicidal ideation with plan to purchase a gun, shoot herself in the chest or femoral artery or overdose  Post discharge from inpatient admission she return to office for follow-up on August 22, 2019, she was displaying signs of confusion, reporting memory difficulties, and [facility] cognitive assessment was completed and she scored a 23/30  She was referred for neuro psychological testing as she was requesting to return to work but she was struggling with memory/recall and language  She went to her initial appointment with neuro psychologist Dr Linda Early on 09/04/2019 to determine what specific testing would need to be done    Patient was informed that the actual testing would occur the 1st week of November and the patient allegedly became frustrated with this news and refused to schedule the actual testing and left the office  Vee Dougherty had a follow-up appointment on 09/11/2019 at that time her repeat [facility] cognitive assessment score was 27/30 which was improved, her thought process was linear and organized and her speech was regular rate and rhythm  She remained labile and tearful as well as irritable during that interview from time to time though she was somewhat improved  At that visit visit it was determined that she would need to have repeat lab work to evaluate some complaints that she was having regarding muscle twitching in bilateral extremities from time to time, a few episodes of constipation and diarrhea over the last several weeks and a couple episodes of dizziness off and on over the past few weeks as well as left back flank pain though she denied urinary symptoms as well as fever  Depakote lithium levels thyroid function CBC and CMP were checked, TSH was normal, CMP was relatively normal chloride was 109 and alk-phos was slightly low at 41, CBC was overall normal, lithium level was 1 1, Depakote level was 78  Patient was informed of normal lab results  Criteria for Discharge:  Patient spoke to provider today on the phone 11/13/2019 and informed me that she relocated to [place] and will not be following up in this office any longer  Patient reports that she is scheduled to see a new psychiatrist on December 9, 2019 and she also reports that she has enough medication until that appointment  Aftercare Recommendations:  Follow up with her new psychiatrist in [place]    Discharge Medications:   Current Outpatient Medications:     clonazePAM (KlonoPIN) 0 5 mg tablet, Take 1 tablet (0 5 mg total) by mouth daily at bedtime, Disp: 30 tablet, Rfl: 0    divalproex sodium (DEPAKOTE) 500 mg EC tablet, Take 1 tablet (500 mg total) by mouth 2 (two) times a day, Disp: 60 tablet, Rfl: 0    lithium carbonate (LITHOBID) 300 mg CR tablet, Take 1 tablet (300 mg total) by mouth daily, Disp: 30 tablet, Rfl: 0    lithium carbonate (LITHOBID) 450 mg CR tablet, Take 1 tablet (450 mg total) by mouth daily at bedtime, Disp: 30 tablet, Rfl: 0    traZODone (DESYREL) 150 mg tablet, Take 1 tablet (150 mg total) by mouth daily at bedtime, Disp: 30 tablet, Rfl: 0       Mental Status at Time of most recent visit on 09/11/2019       Mental status:  Appearance age appropriate, casually dressed   Behavior cooperative, calm, good eye contact   Speech normal rate, normal volume, normal pitch   Mood labile, somewhat anxious, somewhat depressed   Affect slightly constricted   Thought Processes organized, linear   Associations intact associations   Thought Content no overt delusions   Perceptual Disturbances: no auditory hallucinations, visual hallucinations of "People walking by front window but when she looks no one is there"   Abnormal Thoughts  Risk Potential Suicidal ideation - None  Homicidal ideation - None  Potential for aggression - No   Orientation oriented to person, place, time/date and situation   Memory recent memory intact, remote memory intact   Consciousness alert and awake   Attention Span Concentration Span attention span and concentration are age appropriate   Intellect appears to be of average intelligence   Insight moderate   Judgement moderate   Muscle Strength and  Gait normal muscle strength and normal muscle tone, normal gait and normal balance   Motor activity no abnormal movements   Language no difficulty naming common objects, no difficulty repeating a phrase, no difficulty writing a sentence   Fund of Knowledge adequate knowledge of current events  adequate fund of knowledge regarding past history  adequate fund of knowledge regarding vocabulary    Pain none   Pain Scale 0

## 2019-11-14 NOTE — TELEPHONE ENCOUNTER
Patient has relocated to [place], she has an appointment with her new psychiatrist on December 9th  She states that she will contact her new psychiatrist office tomorrow to have them her request her records from this office and from her hospitalization so they can evaluate her and write a letter on her behalf to the [facility] for [place]  I explained that this office is unable to write a letter to the [facility] as I cannot currently examine her and vouch for compliance with aftercare at this time  I am unable to evaluate her for impairment or professional judgment at this moment  I can only comment on what was previously seen which is not real time  We saw verbalizes understanding, she will contact the office to have them send a release of information, I explained that I would be happy to have records forwarded from the my visits with her

## 2021-08-11 NOTE — CASE MANAGEMENT
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 Clindamycin is also present in breast milk.

## 2024-05-30 NOTE — DISCHARGE SUMMARY
Discharge Summary - [address]  [facility] 38 y o  female MRN: [id number]  Unit/Bed#: -01 Encounter: [id number]     Admission Date: 8/6/2019         Discharge Date: No discharge date for patient encounter  Attending Psychiatrist: Viviana Mcduffie MD    Reason for Admission/HPI:     Issa Palacio is a 38 y o  female with a history of Bipolar Disorder who was admitted to the inpatient psychiatric unit on a voluntary 201 commitment basis due to depression, anxiety and suicidal ideation      Symptoms prior to admission included worsening depression, feeling depressed, suicidal ideation, hopelessness, helplessness, auditory hallucinations and visual hallucinations  Onset of symptoms was gradual starting few months ago with gradually worsening course since that time  Stressors preceding admission included chronic mental illness        On initial evaluation after admission to the inpatient psychiatric unit Andi stated that she came to the hospital because of symptoms of worsening depressions hopelessness and suicidal thoughts with plan to buy a gun and shoot herself in the chest or the femoral artery  Patient reports stressors to include recent job she took at the end of April 2019  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  Patient reports that she had 5 jobs in the last 4 years as she has been struggling with mental illness  Patient reports poor sleep at night  She reports increased appetite, decreased energy level and anhedonia  Patient reports that she was hearing music in the corner and hearing people calling her name about 4 days ago  She stated that she also saw birds in the lloyd and the apps buttons on her phone moving around yesterday  Patient reports history of bipolar disorder with past periods of symptoms of elated mood, increase in sexual drive, increased goal-directed activity and increase good increased energy level, and decreased need for sleep          Past Medical History:   Diagnosis Date    Addiction to drug ([facility] )     Alcohol abuse     Alcoholism ([facility] )     Anemia due to vitamin B12 deficiency     last assessed 10/10/17, iron deficiency anemia    Bipolar affective disorder (HCC)     current episode mixed, current episode severity unspecified, last assessed 3/8/17    Depression     Hallucination     Psychosis ([facility] )     Pyelonephritis     Self-injurious behavior     Sleep difficulties     Suicide attempt ([facility] )     Vitamin D deficiency      Past Surgical History:   Procedure Laterality Date    TOTAL ABDOMINAL HYSTERECTOMY  2014    TUBAL LIGATION      WISDOM TOOTH EXTRACTION         Medications: All current active medications have been reviewed  Allergies: Allergies   Allergen Reactions    Lamotrigine Rash and Other (See Comments)     Zetta Foot syndrome    Venofer Verenice Ahle Sucrose] Anaphylaxis     IV Venofer    Sulfa Antibiotics Rash       Objective     Vital signs in last 24 hours:    Temp:  [97 6 °F (36 4 °C)-98 2 °F (36 8 °C)] 98 °F (36 7 °C)  HR:  [] 61  Resp:  [16-20] 16  BP: ()/(49-80) 94/54      Intake/Output Summary (Last 24 hours) at 8/16/2019 1309  Last data filed at 8/16/2019 0739  Gross per 24 hour   Intake 700 ml   Output    Net 700 ml       Hospital Course:     Jamee Vázquez was admitted to the inpatient psychiatric unit and started on Behavioral Health checks every 7 minutes  During the hospitalization she was Behavioral Health checks every 7 minutes  Psychiatric medications were restarted during the hospital stay  To address depressive symptoms, Jamee Vázquez was treated with mood stabilizer Depakote and Lithium  Medication doses were continued during the hospital course  On that dose Lithium level was therapeutic at 1 1 on 8/10/2019 and VPA was 62 9 on the same day   Prior to beginning of treatment medications risks and benefits and possible side effects including risk of kidney impairment related to treatment with Lithium, risk of liver impairment related to treatment with Depakote, risks of misuse, abuse or dependence, sedation and respiratory depression related to treatment with benzodiazepine medications, risks and benefits of treatment with medications in pregnancy and risks and benefits of treatment with medications in lactation were discussed with Sheryl Alba  She verbalized understanding and agreement for treatment  Upon admission she was seen by medical service for medical clearance for inpatient treatment and medical follow up  Jayla's symptoms slowly improved over the hospital course  Initially after admission she was still feeling depressed  With adjustment of medications and therapeutic milieu her symptoms gradually improved  At the end of treatment Sherylkailee Abla was doing much better  Her mood was significantly improved at the time of discharge  She denied suicidal ideation, intent or plan at the time of discharge and denied homicidal ideation, intent or plan at the time of discharge  There was no overt psychosis at the time of discharge  Auditory hallucinations were resolved  She was participating appropriately in milieu at the time of discharge  Behavior was appropriate on the unit at the time of discharge  Sleep and appetite were improved  She was tolerating medications and was not reporting any significant side effects at the time of discharge  Since she was doing well at the end of the hospitalization, treatment team felt that she could be safely discharged to outpatient care  Prior to discharge  spoke with her's daughter to address support and her readiness for discharge  The outpatient follow up at [address] [place] [address] was arranged by the unit  upon discharge      Mental Status at Time of Discharge:     Appearance:  casually dressed   Behavior:  cooperative   Speech:  normal rate and volume   Mood:  improved   Affect:  brighter   Thought Process:  coherent   Associations: intact associations   Thought Content:  no overt delusions   Perceptual Disturbances: no auditory hallucinations, no visual hallucinations   Risk Potential: Suicidal ideation - None  Homicidal ideation - None  Potential for aggression - No   Sensorium:  oriented to person, place and time/date   Memory:  recent and remote memory grossly intact   Consciousness:  alert and awake   Attention: attention span and concentration are age appropriate   Insight:  fair   Judgment: fair   Gait/Station: normal gait/station   Motor Activity: no abnormal movements       Admission Diagnosis:    Principal Problem:    Bipolar I disorder, severe, current or most recent episode depressed, with psychotic features ([facility] )      Discharge Diagnosis:     Principal Problem:    Bipolar I disorder, severe, current or most recent episode depressed, with psychotic features ([facility] )  Resolved Problems:    * No resolved hospital problems  *      Lab Results: I have personally reviewed all pertinent laboratory/tests results  Discharge Medications:    See after visit summary for all reconciled discharge medications provided to patient and family  Discharge instructions/Information to patient and family:     See after visit summary for information provided to patient and family  Provisions for Follow-Up Care:    See after visit summary for information related to follow-up care and any pertinent home health orders  Discharge Statement:    I spent 30 minutes discharging the patient  This time was spent on the day of discharge  I had direct contact with the patient on the day of discharge  Additional documentation is required if more than 30 minutes were spent on discharge:    I reviewed with Sherron Reynolds importance of compliance with medications and outpatient treatment after discharge  I discussed the medication regimen and possible side effects of the medications with Sherron Reynolds prior to discharge   At the time of discharge she was tolerating psychiatric medications  I discussed outpatient follow up with Keturah Lindsey  I reviewed with Keturah Lindsey crisis plan and safety plan upon discharge  I discussed with Keturah Lindsey recommendation to follow up with outpatient drug and alcohol counseling and AA meetings  Keturah Lindsey agreed to abstain from drug and alcohol use after discharge      Lance Kennedy MD 08/16/19 Chart(s)/Patient